# Patient Record
Sex: FEMALE | NOT HISPANIC OR LATINO | URBAN - METROPOLITAN AREA
[De-identification: names, ages, dates, MRNs, and addresses within clinical notes are randomized per-mention and may not be internally consistent; named-entity substitution may affect disease eponyms.]

---

## 2018-05-08 ENCOUNTER — TRANSCRIBE ORDERS (OUTPATIENT)
Dept: PHYSICAL THERAPY | Facility: HOSPITAL | Age: 58
End: 2018-05-08

## 2018-05-08 DIAGNOSIS — I89.0 LYMPHEDEMA: Primary | ICD-10-CM

## 2018-05-09 ENCOUNTER — HOSPITAL ENCOUNTER (OUTPATIENT)
Dept: PHYSICAL THERAPY | Facility: HOSPITAL | Age: 58
Setting detail: THERAPIES SERIES
Discharge: HOME | End: 2018-05-09

## 2018-05-09 DIAGNOSIS — I89.0 LYMPHEDEMA: Primary | ICD-10-CM

## 2018-05-09 PROCEDURE — 97162 PT EVAL MOD COMPLEX 30 MIN: CPT

## 2018-05-09 NOTE — THERAPY EVALUATION
Physical Therapy Lymphedema Initial Evaluation   [facility]     Patient Name: Yuliet Ha  : 1960  MRN: [id number]  Today's Date: 2018      Visit Date: 2018    Visit Dx:    ICD-10-CM ICD-9-CM   1. Lymphedema I89.0 457.1       There is no problem list on file for this patient.       Past Medical History:   Diagnosis Date   • Arthritis         No past surgical history on file.    Visit Dx:    ICD-10-CM ICD-9-CM   1. Lymphedema I89.0 457.1             Patient History     Row Name 18 1000             History    Chief Complaint Swelling;Difficulty Walking  -PC      Date Current Problem(s) Began --   3 years ago  -PC      Brief Description of Current Complaint Pt states she began having swelling in her legs 3 years ago but did not have insurance until now. States she has not had trauma or surgeries on her legs, has never had chemo or radiation. States this swelling has limited her mobility and she has gained weight as well.  -PC      Patient/Caregiver Goals Decrease swelling;Know what to do to help the symptoms;Improve mobility;Relieve pain  -PC      Are you or can you be pregnant No  -PC         Pain     Pain Location Back;Hand;Knee  -PC      Pain at Present 8  -PC      Pain at Best 8  -PC      Pain at Worst 8  -PC      Difficulties with ADL's? Walking limited  -PC      Difficulties with recreational activities? Walking limited  -PC         Fall Risk Assessment    Any falls in the past year: No  -PC         Services    Are you currently receiving Home Health services No  -PC         Daily Activities    Primary Language English  -PC      Are you able to read Yes  -PC      Are you able to write Yes  -PC      How does patient learn best? Reading  -PC      Teaching needs identified Management of Condition;Home Exercise Program  -PC      Patient is concerned about/has problems with Climbing Stairs;Performing home management (household chores, shopping, care of dependents);Walking  -PC       Does patient have problems with the following? None  -PC      Barriers to learning None  -PC      Functional Status mobility issues preventing performance of daily activities  -PC      Explanation of Functional Status Problem Pt has difficulty walking.  -PC      Pt Participated in POC and Goals Yes  -PC         Safety    Are you being hurt, hit, or frightened by anyone at home or in your life? No  -PC      Are you being neglected by a caregiver No  -PC        User Key  (r) = Recorded By, (t) = Taken By, (c) = Cosigned By    Initials Name Provider Type    PC Hannah Fuentes, PT Physical Therapist                Lymphedema     Row Name 05/09/18 1000             Subjective Pain    Able to rate subjective pain? yes  -PC      Pre-Treatment Pain Level 8  -PC      Post-Treatment Pain Level 8  -PC         Subjective Comments    Subjective Comments States she hasn't had insurance for 3 years so was unable to seek treatment until now.  -PC         Lymphedema Assessment    Lymphedema Classification RLE:;LLE:;stage 3 (Lymphostatic Elephantiasis)  -PC         Lymphedema Edema Assessment    Ptting Edema Category --   No pitting  -PC      Edema Assessment Comment Pt has severe edema from calf areas to mid thighs on both legs with lobules post calf and above post knees. Lobules mostly soft with min fibrosis.  Mod edema both ankles and min edema both feet.  -PC         Skin Changes/Observations    Skin Observations Comment Skin is dry but intact.  No open sores or drainage.  -PC         Lymphedema Sensation    Lymphedema Sensation Tests light touch  -PC      Lymphedema Light Touch WNL  -PC         Lymphedema Measurements    Measurement Type(s) Circumferential  -PC      Circumferential Areas Lower extremities  -PC         LLE Circumferential (cm)    Measurement Location 1 10cm above knee  -PC      Left 1 101 cm  -PC      Measurement Location 2 Knee  -PC      Left 2 78 cm  -PC      Measurement Location 3 10cm below knee  -PC      Left  3 75 cm  -PC      Measurement Location 4 20cm below knee  -PC      Left 4 48 cm  -PC      Measurement Location 5 Ankle  -PC      Left 5 24.7 cm  -PC      Measurement Location 6 Midfoot  -PC      Left 6 26.5 cm  -PC      Measurement Location 7 Total  -PC      Left 7 353.2 cm  -PC         RLE Circumferential (cm)    Measurement Location 1 10cm above knee  -PC      Right 1 96 cm  -PC      Measurement Location 2 Knee  -PC      Right 2 68 cm  -PC      Measurement Location 3 10cm below knee  -PC      Right 3 62 cm  -PC      Measurement Location 4 20cm below knee  -PC      Right 4 44 cm  -PC      Measurement Location 5 Ankle  -PC      Right 5 24 cm  -PC      Measurement Location 6 Midfoot  -PC      Right 6 24 cm  -PC      Measurement Location 7 Total  -PC      Right 7 318 cm  -PC        User Key  (r) = Recorded By, (t) = Taken By, (c) = Cosigned By    Initials Name Provider Type    PC Hannah Fuentes PT Physical Therapist                  PT Ortho     Row Name 05/09/18 1000       Posture/Observations    Posture/Observations Comments Pt ambulates very slowly and with much effort.  Has difficulty lifting legs to move forward.  Sidebends trunk to assist with lifting leg off ground.  -PC       General ROM    GENERAL ROM COMMENTS Both knees limited due to lobules.  -PC      User Key  (r) = Recorded By, (t) = Taken By, (c) = Cosigned By    Initials Name Provider Type    PC Hannah Fuentes PT Physical Therapist                    Therapy Education  Education Details: Reviewed treatment program and plan of care.  Given: Other (comment)  Program: New  How Provided: Verbal, Written (Issued written info on Lymphedema.)  Provided to: Patient  Level of Understanding: Verbalized            Exercises     Row Name 05/09/18 1000             Subjective Comments    Subjective Comments States she hasn't had insurance for 3 years so was unable to seek treatment until now.  -PC         Subjective Pain    Able to rate subjective pain? yes  -PC       Pre-Treatment Pain Level 8  -PC      Post-Treatment Pain Level 8  -PC        User Key  (r) = Recorded By, (t) = Taken By, (c) = Cosigned By    Initials Name Provider Type    PC Hannah Fuentes PT Physical Therapist                              PT OP Goals     Row Name 05/09/18 1100          PT Short Term Goals    STG Date to Achieve 05/23/18  -PC     STG 1 Pt demo awareness of condition and precautions for improved prevention, management, care of symptoms, and ease of transition to self-care of condition.   -PC     STG 1 Progress New  -PC     STG 2 Pt/family independent with self-wrapping techniques of compression bandages as indicated for improved self-management of condition.  -PC     STG 2 Progress New  -PC     STG 3 Pt demo decreased net edema of >/=10-15cm for decreased edema symptoms, decreased risk of infection, and improved skin care.  -PC     STG 3 Progress New  -PC        Long Term Goals    LTG Date to Achieve 06/09/18  -PC     LTG 1 Pt/family independent with self-care techniques for self-management of condition.  -PC     LTG 1 Progress New  -PC     LTG 2 Pt demo decreased net edema of >/=15-30cm for decreased edema symptoms, decreased risk of infection, and improved skin care.  -PC     LTG 2 Progress New  -PC     LTG 3 Pt/family independent with compression garments as indicated for self-management of condition.  -PC     LTG 3 Progress New  -PC        Time Calculation    PT Goal Re-Cert Due Date 08/09/18  -PC       User Key  (r) = Recorded By, (t) = Taken By, (c) = Cosigned By    Initials Name Provider Type    PC Hannah Fuentes PT Physical Therapist                PT Assessment/Plan     Row Name 05/09/18 1124          PT Assessment    Functional Limitations Performance in leisure activities;Limitations in community activities;Performance in self-care ADL;Impaired gait  -PC     Impairments Edema;Gait;Endurance;Impaired lymphatic circulation;Range of motion;Pain  -PC     Assessment Comments Pt is a 57   yr old female who has had severe swelling in her legs for approx 3 years.  She did not have insurance so was unable to seek treatment until now.  She reports no surgeries or trauma to her legs, and no history of chemo or radiation.  Pt reports pain at level 8/10 in her back and knees and difficulty walking.  She presents with severe edema in both legs from calf areas to mid thighs.  She has mod edema in both ankles and min edema in both feet.  Edema is soft without pitting.  She has lobules on both legs post calf and post lower thigh. Lobules are mostly soft with some fibrosis.  Pt has limited knee ROM due to the lobules.  Skin is dry, but no open sores or weeping. PT ambulates with much difficulty, very slowly with much lateral trunk movement.  She has difficulty with sit to stand and getting in/out of car as well. Pt's  can help with bandaging on weekends. Feel she would benefit from a compression pump for home use as well to help with the lobules.  -PC     Please refer to paper survey for additional self-reported information Yes  -PC     Rehab Potential Good  -PC     Patient/caregiver participated in establishment of treatment plan and goals Yes  -PC     Patient would benefit from skilled therapy intervention Yes  -PC        PT Plan    PT Frequency 5x/week  -PC     Predicted Duration of Therapy Intervention (OT Eval) 4 weeks  -PC     Planned CPT's? PT EVAL MOD COMPLELITY: 30607;PT MANUAL THERAPY EA 15 MIN: 48020;PT THER ACT EA 15 MIN: 65642;PT SELF CARE/HOME MGMT/TRAIN EA 15: 90595  -PC     Physical Therapy Interventions (Optional Details) bandaging;home exercise program;manual lymphatic drainage;patient/family education  -PC     PT Plan Comments See POC.  -PC       User Key  (r) = Recorded By, (t) = Taken By, (c) = Cosigned By    Initials Name Provider Type    PC Hannah Fuentes, PT Physical Therapist                       Time Calculation:   Start Time: 0945  Stop Time: 1020  Time Calculation (min): 35  min     Therapy Charges for Today     Code Description Service Date Service Provider Modifiers Qty    27405175272 HC PT EVAL MOD COMPLEXITY 2 5/9/2018 Hannha Fuentes, PT GP 1                    Hannah Fuentes, PT  5/9/2018

## 2018-05-14 ENCOUNTER — HOSPITAL ENCOUNTER (OUTPATIENT)
Dept: PHYSICAL THERAPY | Facility: HOSPITAL | Age: 58
Setting detail: THERAPIES SERIES
Discharge: HOME | End: 2018-05-14

## 2018-05-14 DIAGNOSIS — I89.0 LYMPHEDEMA: Primary | ICD-10-CM

## 2018-05-14 PROCEDURE — 97140 MANUAL THERAPY 1/> REGIONS: CPT

## 2018-05-14 NOTE — THERAPY TREATMENT NOTE
Outpatient Physical Therapy Lymphedema Treatment Note   [facility]     Patient Name: Yuliet Ha  : 1960  MRN: [id number]  Today's Date: 2018        Visit Date: 2018    Visit Dx:    ICD-10-CM ICD-9-CM   1. Lymphedema I89.0 457.1       There is no problem list on file for this patient.             Lymphedema     Row Name 18 1000             Subjective Pain    Able to rate subjective pain? yes  -PC      Pre-Treatment Pain Level 8  -PC      Post-Treatment Pain Level 8  -PC         Subjective Comments    Subjective Comments States the edema makes her legs so heavy it is difficult to walk or exercise.  -PC         Manual Lymphatic Drainage    Manual Lymphatic Drainage Comments B inguinal, BLE's/  -PC         Compression/Skin Care    Skin Care moisturizing lotion applied   Eucerin lotion  -PC      Wrapping Location lower extremity  -PC      Wrapping Location LE bilateral:;foot to knee  -PC      Bandaging Comments Right: Tg9, artiflex foot and ankle, Ros soft ankle to knee, Short stretch bandages 1-8cm, 1-10cm, 2-12cm.  Left: TgK1, artiflex foot and ankle, thin gray foam around calf secured with transelast, Short stretch bandages 1-8cm, 1-10cm, 2-12cm.  Cast shoes.  -PC      Compression/Skin Care Comments Right leg very difficult to bandage due to size of calf being very large and size of ankle much smaller.  -PC        User Key  (r) = Recorded By, (t) = Taken By, (c) = Cosigned By    Initials Name Provider Type    PC Hannah Fuentes, PT Physical Therapist                              PT Assessment/Plan     Row Name 18 1032          PT Assessment    Assessment Comments PT returns to start treatment today.  Left leg was very difficult to wrap due to difference in size from calf to ankle.    -PC        PT Plan    PT Plan Comments Cont per POC.  -PC       User Key  (r) = Recorded By, (t) = Taken By, (c) = Cosigned By    Initials Name Provider Type    PC Hannah Fuentes, SERVANDO Physical  Therapist                     Exercises     Row Name 05/14/18 1000             Subjective Comments    Subjective Comments States the edema makes her legs so heavy it is difficult to walk or exercise.  -PC         Subjective Pain    Able to rate subjective pain? yes  -PC      Pre-Treatment Pain Level 8  -PC      Post-Treatment Pain Level 8  -PC        User Key  (r) = Recorded By, (t) = Taken By, (c) = Cosigned By    Initials Name Provider Type    PC Hannah Fuentes, PT Physical Therapist                            Therapy Education  Education Details: Began bandaging teaching.  Reviewed bandaging precautions.  Given: Bandaging/dressing change  Program: New  How Provided: Verbal, Demonstration  Provided to: Patient  Level of Understanding: Verbalized              Time Calculation:   Start Time: 0840 (Pt was 10 min late)  Stop Time: 1000  Time Calculation (min): 80 min  Total Timed Code Minutes- PT: 80 minute(s)     Therapy Charges for Today     Code Description Service Date Service Provider Modifiers Qty    51619877203 HC PT MANUAL THERAPY EA 15 MIN 5/14/2018 Hannah Fuentes, PT GP 5                    Hannah Fuentes, PT  5/14/2018

## 2018-05-15 ENCOUNTER — HOSPITAL ENCOUNTER (OUTPATIENT)
Dept: PHYSICAL THERAPY | Facility: HOSPITAL | Age: 58
Setting detail: THERAPIES SERIES
Discharge: HOME | End: 2018-05-15

## 2018-05-15 DIAGNOSIS — I89.0 LYMPHEDEMA: Primary | ICD-10-CM

## 2018-05-15 PROCEDURE — 97140 MANUAL THERAPY 1/> REGIONS: CPT

## 2018-05-15 NOTE — THERAPY TREATMENT NOTE
Outpatient Physical Therapy Lymphedema Treatment Note   [facility]     Patient Name: Yuliet Ha  : 1960  MRN: [id number]  Today's Date: 5/15/2018        Visit Date: 05/15/2018    Visit Dx:    ICD-10-CM ICD-9-CM   1. Lymphedema I89.0 457.1       There is no problem list on file for this patient.             Lymphedema     Row Name 05/15/18 1100             Subjective Pain    Able to rate subjective pain? yes  -KD      Pre-Treatment Pain Level 8  -KD      Post-Treatment Pain Level 8  -KD         Subjective Comments    Subjective Comments States the left bandage stayed up until about 1pm yesterday, the right until about 9pm.  -KD         Manual Lymphatic Drainage    Manual Lymphatic Drainage Comments B inguinal, B LE's  -KD         Compression/Skin Care    Skin Care moisturizing lotion applied   Eucerin lotion  -KD      Wrapping Location lower extremity  -KD      Wrapping Location LE bilateral:;foot to knee  -KD      Bandaging Comments Right: Tg9, artiflex foot and ankle, Ros soft ankle to knee, Short stretch bandages 1-8cm, 1-10cm, 2-12cm. Covered with Tubigrip size G. Left: Tubigrip size J mid calf to knee/tg 9 foot and lower calf, 1/2 roll of artiflex foot and ankle/ full roll distal lower leg, thin gray foam around calf secured with transelast, Short stretch bandages 1-8cm, 1-10cm, 2-12cm. Covered with Tubigrip size J. Cast shoes.  -KD        User Key  (r) = Recorded By, (t) = Taken By, (c) = Cosigned By    Initials Name Provider Type    CHARLEY Acuna PT Physical Therapist                              PT Assessment/Plan     Row Name 05/15/18 6045          PT Assessment    Assessment Comments Made some changes to bandaging today to hopefully help keep bandages from sliding down too soon-will monitor response/tolerance.  -KD        PT Plan    PT Plan Comments Cont.  -KD       User Key  (r) = Recorded By, (t) = Taken By, (c) = Cosigned By    Initials Name Provider Type    CHARLEY Acuna PT  Physical Therapist                     Exercises     Row Name 05/15/18 1100             Subjective Comments    Subjective Comments States the left bandage stayed up until about 1pm yesterday, the right until about 9pm.  -KD         Subjective Pain    Able to rate subjective pain? yes  -KD      Pre-Treatment Pain Level 8  -KD      Post-Treatment Pain Level 8  -KD        User Key  (r) = Recorded By, (t) = Taken By, (c) = Cosigned By    Initials Name Provider Type    CHARLEY Acuna PT Physical Therapist                            Therapy Education  Education Details: Bandaging discussion.  Given: Bandaging/dressing change  Program: Reinforced  How Provided: Verbal, Demonstration  Provided to: Patient  Level of Understanding: Verbalized              Time Calculation:   Start Time: 0945  Stop Time: 1104  Time Calculation (min): 79 min     Therapy Charges for Today     Code Description Service Date Service Provider Modifiers Qty    36479634690 HC PT MANUAL THERAPY EA 15 MIN 5/15/2018 Cherelle Acuna, PT GP 5                    Cherelle Acuna PT  5/15/2018

## 2018-05-16 ENCOUNTER — HOSPITAL ENCOUNTER (OUTPATIENT)
Dept: PHYSICAL THERAPY | Facility: HOSPITAL | Age: 58
Setting detail: THERAPIES SERIES
Discharge: HOME | End: 2018-05-16

## 2018-05-16 DIAGNOSIS — I89.0 LYMPHEDEMA: Primary | ICD-10-CM

## 2018-05-16 PROCEDURE — 97140 MANUAL THERAPY 1/> REGIONS: CPT

## 2018-05-16 NOTE — THERAPY TREATMENT NOTE
Outpatient Physical Therapy Lymphedema Treatment Note   [facility]     Patient Name: Yuliet Ha  : 1960  MRN: [id number]  Today's Date: 2018        Visit Date: 2018    Visit Dx:    ICD-10-CM ICD-9-CM   1. Lymphedema I89.0 457.1       There is no problem list on file for this patient.             Lymphedema     Row Name 18 1100             Subjective Pain    Able to rate subjective pain? yes  -PC      Pre-Treatment Pain Level 7  -PC      Post-Treatment Pain Level 7  -PC         Subjective Comments    Subjective Comments States the bandages stayed up a little better.  Right ones until about 6pm, and she still has the left ones on.  Thinks her left calf is less swollen.  -PC         Lymphedema Edema Assessment    Edema Assessment Comment Noted increased wrinkles and tissue is softer in left calf.   -PC         Skin Changes/Observations    Skin Observations Comment Intact, no redness or sores.  -PC         Manual Lymphatic Drainage    Manual Lymphatic Drainage Comments B inguinal, B LE's  -PC         Compression/Skin Care    Compression/Skin Care remove bandages   on right  -PC      Skin Care washed/dried;lotion applied   Eucerin  -PC      Wrapping Location lower extremity  -PC      Wrapping Location LE bilateral:;foot to knee  -PC      Bandaging Comments Right: Tg9, artiflex foot and ankle, Rosidal soft from knee down to ankle, short stretch bandages 1-6cm foot and ankle, 1-10cm from knee toward ankle, 2-12cm ankle to knee. Covered with Tubigrip. Left: Tg9, artiflex foot, ankle, and lower leg, Rosidal soft from knee to ankle, gray foam around lower half of lower leg, tubigrip, 1-6cm foot and ankle, 1-10cm from knee down toward ankle, 1-12cm ankle to knee, and 1-10cm calf to knee. Tubigrip over all. Cast shoes.  -PC        User Key  (r) = Recorded By, (t) = Taken By, (c) = Cosigned By    Initials Name Provider Type    PC Hannah Fuentes, PT Physical Therapist                               PT Assessment/Plan     Row Name 05/16/18 1147          PT Assessment    Assessment Comments Bandages stayed up a little better.  Again made some changes to try to keep them from sliding. Did note loose tissue and increased wrinkles in left calf, indicating decreased edema.  -PC       User Key  (r) = Recorded By, (t) = Taken By, (c) = Cosigned By    Initials Name Provider Type    PC Hannah Fuentes, PT Physical Therapist                     Exercises     Row Name 05/16/18 1100             Subjective Comments    Subjective Comments States the bandages stayed up a little better.  Right ones until about 6pm, and she still has the left ones on.  Thinks her left calf is less swollen.  -PC         Subjective Pain    Able to rate subjective pain? yes  -PC      Pre-Treatment Pain Level 7  -PC      Post-Treatment Pain Level 7  -PC        User Key  (r) = Recorded By, (t) = Taken By, (c) = Cosigned By    Initials Name Provider Type    PC Hannah Fuentes, PT Physical Therapist                            Therapy Education  Education Details: Instructed pt in ankle pumps and encouraged walking.  Given: HEP  Program: New  How Provided: Verbal, Demonstration  Provided to: Patient  Level of Understanding: Verbalized, Demonstrated              Time Calculation:   Start Time: 0835  Stop Time: 0955  Time Calculation (min): 80 min  Total Timed Code Minutes- PT: 80 minute(s)     Therapy Charges for Today     Code Description Service Date Service Provider Modifiers Qty    08321979518 HC PT MANUAL THERAPY EA 15 MIN 5/16/2018 Hannah Fuentes, PT GP 5                    Hannah Fuentes, PT  5/16/2018

## 2018-05-17 ENCOUNTER — HOSPITAL ENCOUNTER (OUTPATIENT)
Dept: PHYSICAL THERAPY | Facility: HOSPITAL | Age: 58
Setting detail: THERAPIES SERIES
Discharge: HOME | End: 2018-05-17

## 2018-05-17 DIAGNOSIS — I89.0 LYMPHEDEMA: Primary | ICD-10-CM

## 2018-05-17 PROCEDURE — 97140 MANUAL THERAPY 1/> REGIONS: CPT

## 2018-05-17 NOTE — THERAPY TREATMENT NOTE
Outpatient Physical Therapy Lymphedema Treatment Note   [facility]     Patient Name: Yuliet Ha  : 1960  MRN: [id number]  Today's Date: 2018        Visit Date: 2018    Visit Dx:    ICD-10-CM ICD-9-CM   1. Lymphedema I89.0 457.1       There is no problem list on file for this patient.             Lymphedema     Row Name 18 1100             Subjective Pain    Able to rate subjective pain? yes  -KD      Pre-Treatment Pain Level 7  -KD      Post-Treatment Pain Level 7  -KD         Subjective Comments    Subjective Comments States the right leg bandages slid down about 1pm yesterday, but the left ones have stayed up well. Reports some discomfort in left leg for a while yesterday, but it improved. States she's now able to feel her heel touch the table when she has her leg stretched out because the swelling is less in the upper calf.  -KD         Lymphedema Edema Assessment    Edema Assessment Comment Noted definite wrinkling left leg lobule indicating decrease in edema.  -KD         Manual Lymphatic Drainage    Manual Lymphatic Drainage Comments B inguinal, B LE's  -KD         Compression/Skin Care    Compression/Skin Care remove bandages  -KD      Skin Care washed/dried;lotion applied   Eucerin  -KD      Wrapping Location lower extremity  -KD      Wrapping Location LE bilateral:;foot to knee  -KD      Bandaging Comments Both legs: Tg9, artiflex foot, ankle, and lower leg, Rosidal soft from knee to ankle, gray foam around lower half of lower leg, tubigrip, 1-6cm foot and ankle, 1-10cm from knee down toward ankle, 1-12cm ankle to knee, and 1-10cm calf to knee. Tubigrip over all. Cast shoes.  -KD        User Key  (r) = Recorded By, (t) = Taken By, (c) = Cosigned By    Initials Name Provider Type    CHARLEY Acuna, PT Physical Therapist                              PT Assessment/Plan     Row Name 18 1106 18 1147       PT Assessment    Assessment Comments Bandages stayed up  well on left leg, but had slid down several inches on the right. Bandaged right leg today the same as the left to encourage bandages to stay up better. Swelling does appear to be decreasing in the left calf.  -KD Bandages stayed up a little better.  Again made some changes to try to keep them from sliding. Did note loose tissue and increased wrinkles in left calf, indicating decreased edema.  -PC       PT Plan    PT Plan Comments Cont.  -KD  --      User Key  (r) = Recorded By, (t) = Taken By, (c) = Cosigned By    Initials Name Provider Type    LISA Fuentes, PT Physical Therapist    CHARLEY Acuna PT Physical Therapist                     Exercises     Row Name 05/17/18 1100             Subjective Comments    Subjective Comments States the right leg bandages slid down about 1pm yesterday, but the left ones have stayed up well. Reports some discomfort in left leg for a while yesterday, but it improved. States she's now able to feel her heel touch the table when she has her leg stretched out because the swelling is less in the upper calf.  -KD         Subjective Pain    Able to rate subjective pain? yes  -KD      Pre-Treatment Pain Level 7  -KD      Post-Treatment Pain Level 7  -KD        User Key  (r) = Recorded By, (t) = Taken By, (c) = Cosigned By    Initials Name Provider Type    CHARLEY Acuna PT Physical Therapist                            Therapy Education  Education Details: Bandaging  Given: Bandaging/dressing change  Program: Reinforced  How Provided: Verbal, Demonstration  Provided to: Patient  Level of Understanding: Verbalized              Time Calculation:   Start Time: 0834  Stop Time: 0953  Time Calculation (min): 79 min     Therapy Charges for Today     Code Description Service Date Service Provider Modifiers Qty    39521695296  PT MANUAL THERAPY EA 15 MIN 5/17/2018 Cherelle Acuna, PT GP 5                    Cherelle Acuna PT  5/17/2018

## 2018-05-18 ENCOUNTER — HOSPITAL ENCOUNTER (OUTPATIENT)
Dept: PHYSICAL THERAPY | Facility: HOSPITAL | Age: 58
Setting detail: THERAPIES SERIES
Discharge: HOME | End: 2018-05-18

## 2018-05-18 DIAGNOSIS — I89.0 LYMPHEDEMA: Primary | ICD-10-CM

## 2018-05-18 PROCEDURE — 97140 MANUAL THERAPY 1/> REGIONS: CPT

## 2018-05-18 NOTE — THERAPY TREATMENT NOTE
Outpatient Physical Therapy Lymphedema Treatment Note   [facility]     Patient Name: Yuliet Ha  : 1960  MRN: [id number]  Today's Date: 2018        Visit Date: 2018    Visit Dx:    ICD-10-CM ICD-9-CM   1. Lymphedema I89.0 457.1       There is no problem list on file for this patient.             Lymphedema     Row Name 18 1000 18 1100          Subjective Pain    Able to rate subjective pain? yes  -PC yes  -KD     Pre-Treatment Pain Level 7  -PC 7  -KD     Post-Treatment Pain Level 7  -PC 7  -KD     Subjective Pain Comment Pain is 0 at rest, 7 while walking.  -PC  --        Subjective Comments    Subjective Comments States the right ones started slipping at 1:00 yesterday, the right ones started slipping this am. States she can tell this is helping.  -PC States the right leg bandages slid down about 1pm yesterday, but the left ones have stayed up well. Reports some discomfort in left leg for a while yesterday, but it improved. States she's now able to feel her heel touch the table when she has her leg stretched out because the swelling is less in the upper calf.  -KD        Lymphedema Edema Assessment    Edema Assessment Comment  -- Noted definite wrinkling left leg lobule indicating decrease in edema.  -KD        Skin Changes/Observations    Skin Observations Comment Skin on lower half of left lower leg slightly red.  Both legs slightly itchy today per pt.  -PC  --        Lymphedema Measurements    Measurement Type(s) Circumferential  -PC  --     Circumferential Areas Lower extremities  -PC  --        LLE Circumferential (cm)    Measurement Location 1 10cm above knee  -PC  --     Left 1 98 cm  -PC  --     Measurement Location 2 Knee  -PC  --     Left 2 60 cm  -PC  --     Measurement Location 3 10cm below knee  -PC  --     Left 3 71 cm  -PC  --     Measurement Location 4 20cm below knee  -PC  --     Left 4 46 cm  -PC  --     Measurement Location 5 Ankle  -PC  --     Left 5  23.6 cm  -PC  --     Measurement Location 6 Midfoot  -PC  --     Left 6 25.7 cm  -PC  --     Measurement Location 7 Total  -PC  --     Left 7 324.3 cm  -PC  --     Measurement Location 8 Total decrease   -PC  --     Left 8 -28.9 cm  -PC  --        RLE Circumferential (cm)    Measurement Location 1 10cm above knee  -PC  --     Right 1 91 cm  -PC  --     Measurement Location 2 Knee  -PC  --     Right 2 57 cm  -PC  --     Measurement Location 3 10cm below knee  -PC  --     Right 3 59 cm  -PC  --     Measurement Location 4 20cm below knee  -PC  --     Right 4 42 cm  -PC  --     Measurement Location 5 Ankle  -PC  --     Right 5 22.8 cm  -PC  --     Measurement Location 6 Midfoot  -PC  --     Right 6 24 cm  -PC  --     Measurement Location 7 Total  -PC  --     Right 7 295.8 cm  -PC  --     Measurement Location 8 Total decrease  -PC  --     Right 8 -22.2 cm  -PC  --        Manual Lymphatic Drainage    Manual Lymphatic Drainage Comments B inguinal, B LE's  -PC B inguinal, B LE's  -KD        Compression/Skin Care    Compression/Skin Care remove bandages  -PC remove bandages  -KD     Skin Care washed/dried;lotion applied   Eucerin  -PC washed/dried;lotion applied   Eucerin  -KD     Wrapping Location lower extremity  -PC lower extremity  -KD     Wrapping Location LE bilateral:;foot to knee  -PC bilateral:;foot to knee  -KD     Bandaging Comments Both legs: Tg9, artiflex foot, ankle, and lower leg, Rosidal soft from knee to ankle, gray foam around lower half of lower leg, tubigrip, 1-6cm foot and ankle, 1-10cm from knee down toward ankle, 1-12cm ankle to knee, and 1-10cm calf to knee. Tubigrip over all. Cast shoes.  -PC Both legs: Tg9, artiflex foot, ankle, and lower leg, Rosidal soft from knee to ankle, gray foam around lower half of lower leg, tubigrip, 1-6cm foot and ankle, 1-10cm from knee down toward ankle, 1-12cm ankle to knee, and 1-10cm calf to knee. Tubigrip over all. Cast shoes.  -KD       User Key  (r) = Recorded By,  (t) = Taken By, (c) = Cosigned By    Initials Name Provider Type    PC Hannah Fuentes, PT Physical Therapist    KD Cherelle Acuna, PT Physical Therapist                              PT Assessment/Plan     Row Name 05/18/18 1028 05/17/18 1106       PT Assessment    Assessment Comments Bandages slid down approx 5-6 inches. Bandaged a little more tightly today.  Msmts have already decreased by 22.2cm on right and by 28.9cm on left.  present to learn bandaging, but this bandaging will be very difficult for them to do at home.  He is willing to try, however.  -PC Bandages stayed up well on left leg, but had slid down several inches on the right. Bandaged right leg today the same as the left to encourage bandages to stay up better. Swelling does appear to be decreasing in the left calf.  -KD       PT Plan    PT Plan Comments Cont.  -PC Cont.  -KD      User Key  (r) = Recorded By, (t) = Taken By, (c) = Cosigned By    Initials Name Provider Type    PC Hannah Fuentes, PT Physical Therapist    CHARLEY Acuna, PT Physical Therapist                     Exercises     Row Name 05/18/18 1000 05/17/18 1100          Subjective Comments    Subjective Comments States the right ones started slipping at 1:00 yesterday, the right ones started slipping this am. States she can tell this is helping.  -PC States the right leg bandages slid down about 1pm yesterday, but the left ones have stayed up well. Reports some discomfort in left leg for a while yesterday, but it improved. States she's now able to feel her heel touch the table when she has her leg stretched out because the swelling is less in the upper calf.  -KD        Subjective Pain    Able to rate subjective pain? yes  -PC yes  -KD     Pre-Treatment Pain Level 7  -PC 7  -KD     Post-Treatment Pain Level 7  -PC 7  -KD     Subjective Pain Comment Pain is 0 at rest, 7 while walking.  -PC  --       User Key  (r) = Recorded By, (t) = Taken By, (c) = Cosigned By    Initials Name  Provider Type    PC Hannah Fuentes, PT Physical Therapist    CHARLEY Acuna, PT Physical Therapist                            Therapy Education  Education Details:  present to learn bandaging.  Given: Bandaging/dressing change  Program: Reinforced  How Provided: Verbal, Demonstration, Written (Issued written instructions)  Provided to: Patient, Caregiver  Level of Understanding: Verbalized              Time Calculation:   Start Time: 0835  Stop Time: 0955  Time Calculation (min): 80 min  Total Timed Code Minutes- PT: 80 minute(s)     Therapy Charges for Today     Code Description Service Date Service Provider Modifiers Qty    64789751338 HC PT MANUAL THERAPY EA 15 MIN 5/18/2018 Hannah Fuentes, PT GP 5                    Hannah Fuentes, PT  5/18/2018

## 2018-05-21 ENCOUNTER — HOSPITAL ENCOUNTER (OUTPATIENT)
Dept: PHYSICAL THERAPY | Facility: HOSPITAL | Age: 58
Setting detail: THERAPIES SERIES
Discharge: HOME | End: 2018-05-21

## 2018-05-21 DIAGNOSIS — I89.0 LYMPHEDEMA: Primary | ICD-10-CM

## 2018-05-21 PROCEDURE — 97140 MANUAL THERAPY 1/> REGIONS: CPT

## 2018-05-21 NOTE — THERAPY TREATMENT NOTE
Outpatient Physical Therapy Lymphedema Treatment Note   [facility]     Patient Name: Yuliet Ha  : 1960  MRN: [id number]  Today's Date: 2018        Visit Date: 2018    Visit Dx:    ICD-10-CM ICD-9-CM   1. Lymphedema I89.0 457.1       There is no problem list on file for this patient.             Lymphedema     Row Name 18 1000             Subjective Pain    Able to rate subjective pain? yes  -PC      Pre-Treatment Pain Level 8  -PC      Post-Treatment Pain Level 8  -PC         Subjective Comments    Subjective Comments States she took it easy and the bandages stayed up perfectly on left and slid just a little on right. States she fell yesterday going up the steps at [employer].  Her left side is a little sore today.   -PC         Lymphedema Edema Assessment    Edema Assessment Comment  had wrapped legs yesterday with fair technique.    -PC         Manual Lymphatic Drainage    Manual Lymphatic Drainage Comments B inguinal, B LE's  -PC         Compression/Skin Care    Compression/Skin Care remove bandages  -PC      Skin Care washed/dried;lotion applied   Eucerin  -PC      Wrapping Location lower extremity  -PC      Wrapping Location LE bilateral:;foot to knee  -PC      Bandaging Comments Both legs: Tg9, artiflex foot, ankle, and lower leg, Rosidal soft from knee to ankle, gray foam around lower half of lower leg, tubigrip, 1-8cm foot and ankle, 1-10cm from knee down toward ankle, 2-12cm ankle to knee. Tubigrip over all. Cast shoes.  -PC        User Key  (r) = Recorded By, (t) = Taken By, (c) = Cosigned By    Initials Name Provider Type    PC Hannah Fuentes, PT Physical Therapist                              PT Assessment/Plan     Row Name 18 1044          PT Assessment    Assessment Comments Pt's  was able to do a fair job with the bandaging. More practice will help.  -PC        PT Plan    PT Plan Comments Cont.  -PC       User Key  (r) = Recorded By, (t) = Taken  By, (c) = Cosigned By    Initials Name Provider Type    PC Hannah Fuentes, PT Physical Therapist                     Exercises     Row Name 05/21/18 1000             Subjective Comments    Subjective Comments States she took it easy and the bandages stayed up perfectly on left and slid just a little on right. States she fell yesterday going up the steps at [employer].  Her left side is a little sore today.   -PC         Subjective Pain    Able to rate subjective pain? yes  -PC      Pre-Treatment Pain Level 8  -PC      Post-Treatment Pain Level 8  -PC        User Key  (r) = Recorded By, (t) = Taken By, (c) = Cosigned By    Initials Name Provider Type    PC Hannah Fuentes, PT Physical Therapist                            Therapy Education  Education Details: Reviewed bandaging again with pt. Discussed walking and ankle pumps as well as other LE ex.  Given: Bandaging/dressing change, HEP  Program: Reinforced  How Provided: Verbal, Demonstration  Provided to: Patient  Level of Understanding: Verbalized              Time Calculation:   Start Time: 0835  Stop Time: 0957  Time Calculation (min): 82 min  Total Timed Code Minutes- PT: 82 minute(s)     Therapy Charges for Today     Code Description Service Date Service Provider Modifiers Qty    [id number]  PT MANUAL THERAPY EA 15 MIN 5/21/2018 Hannah Fuentes, PT GP 5                    Hannah Fuentes PT  5/21/2018

## 2018-05-22 ENCOUNTER — HOSPITAL ENCOUNTER (OUTPATIENT)
Dept: PHYSICAL THERAPY | Facility: HOSPITAL | Age: 58
Setting detail: THERAPIES SERIES
Discharge: HOME | End: 2018-05-22

## 2018-05-22 DIAGNOSIS — I89.0 LYMPHEDEMA: Primary | ICD-10-CM

## 2018-05-22 PROCEDURE — 97140 MANUAL THERAPY 1/> REGIONS: CPT

## 2018-05-22 NOTE — THERAPY TREATMENT NOTE
Outpatient Physical Therapy Lymphedema Treatment Note   [facility]     Patient Name: Yuliet Ha  : 1960  MRN: [id number]  Today's Date: 2018        Visit Date: 2018    Visit Dx:    ICD-10-CM ICD-9-CM   1. Lymphedema I89.0 457.1       There is no problem list on file for this patient.             Lymphedema     Row Name 18 1000             Subjective Pain    Able to rate subjective pain? yes  -KD      Pre-Treatment Pain Level 6  -KD      Post-Treatment Pain Level 6  -KD      Subjective Pain Comment Pain is 0 at rest, 6 when walking  -KD         Subjective Comments    Subjective Comments States she is able to move better and stand for longer periods of time since doing therapy.  -KD         Manual Lymphatic Drainage    Manual Lymphatic Drainage Comments B inguinal, B LE's  -KD         Compression/Skin Care    Compression/Skin Care remove bandages  -KD      Skin Care washed/dried;lotion applied   Eucerin  -KD      Wrapping Location lower extremity  -KD      Wrapping Location LE bilateral:;foot to knee  -KD      Bandaging Comments Both legs: Tg9, artiflex foot, ankle, and lower leg, Rosidal soft from knee to ankle, gray foam around lower half of lower leg, tubigrip, 1-8cm foot and ankle, 1-10cm from knee down toward ankle, 2-12cm ankle to knee. Tubigrip over all. Cast shoes.  -KD        User Key  (r) = Recorded By, (t) = Taken By, (c) = Cosigned By    Initials Name Provider Type    CHARLEY Acuna, PT Physical Therapist                              PT Assessment/Plan     Row Name 18 1100          PT Assessment    Assessment Comments Note improving mobility & tolerance to standing.  -KD        PT Plan    PT Plan Comments Cont.  -KD       User Key  (r) = Recorded By, (t) = Taken By, (c) = Cosigned By    Initials Name Provider Type    CHARLEY Acuna, PT Physical Therapist                     Exercises     Row Name 18 1000             Subjective Comments    Subjective  Comments States she is able to move better and stand for longer periods of time since doing therapy.  -KD         Subjective Pain    Able to rate subjective pain? yes  -KD      Pre-Treatment Pain Level 6  -KD      Post-Treatment Pain Level 6  -KD      Subjective Pain Comment Pain is 0 at rest, 6 when walking  -KD        User Key  (r) = Recorded By, (t) = Taken By, (c) = Cosigned By    Initials Name Provider Type    CHARLEY Acuna PT Physical Therapist                            Therapy Education  Education Details: Bandaging  Given: Bandaging/dressing change  Program: Reinforced  How Provided: Verbal  Provided to: Patient  Level of Understanding: Verbalized              Time Calculation:   Start Time: 0912  Stop Time: 1033  Time Calculation (min): 81 min  Total Timed Code Minutes- PT: 81 minute(s)     Therapy Charges for Today     Code Description Service Date Service Provider Modifiers Qty    82966553909 HC PT MANUAL THERAPY EA 15 MIN 5/22/2018 Cherelle Acuna, PT GP 5                    Cherelle Acuna, PT  5/22/2018

## 2018-05-23 ENCOUNTER — HOSPITAL ENCOUNTER (OUTPATIENT)
Dept: PHYSICAL THERAPY | Facility: HOSPITAL | Age: 58
Setting detail: THERAPIES SERIES
Discharge: HOME | End: 2018-05-23

## 2018-05-23 DIAGNOSIS — I89.0 LYMPHEDEMA: Primary | ICD-10-CM

## 2018-05-23 PROCEDURE — 97140 MANUAL THERAPY 1/> REGIONS: CPT

## 2018-05-23 NOTE — THERAPY TREATMENT NOTE
Outpatient Physical Therapy Lymphedema Treatment Note   [facility]     Patient Name: Yuliet Ha  : 1960  MRN: [id number]  Today's Date: 2018        Visit Date: 2018    Visit Dx:    ICD-10-CM ICD-9-CM   1. Lymphedema I89.0 457.1       There is no problem list on file for this patient.             Lymphedema     Row Name 18 1000             Subjective Pain    Able to rate subjective pain? yes  -PC      Pre-Treatment Pain Level 6  -PC      Post-Treatment Pain Level 6  -PC      Subjective Pain Comment Pain is 0 at rest, 6 when walking  -PC         Subjective Comments    Subjective Comments States she is sleeping better and walking better.  -PC         Manual Lymphatic Drainage    Manual Lymphatic Drainage Comments B inguinal, B LE's  -PC         Compression/Skin Care    Compression/Skin Care remove bandages  -PC      Skin Care washed/dried;lotion applied   Eucerin  -PC      Wrapping Location lower extremity  -PC      Wrapping Location LE bilateral:;foot to knee  -PC      Bandaging Comments Both legs: Tg9, artiflex foot, ankle, and lower leg, Rosidal soft from knee to ankle, gray foam around lower half of lower leg, tubigrip, 1-8cm foot and ankle, 1-10cm from knee down toward ankle, 2-12cm ankle to knee. Tubigrip over all. Cast shoes.  -PC        User Key  (r) = Recorded By, (t) = Taken By, (c) = Cosigned By    Initials Name Provider Type    PC Hannah Fuentes, PT Physical Therapist                              PT Assessment/Plan     Row Name 18 1023 18 1100       PT Assessment    Assessment Comments Overall decreased pain, increased mobility, and improved sleeping per pt.   -PC Note improving mobility & tolerance to standing.  -KD       PT Plan    PT Plan Comments Cont.  Plan to measure on .  -PC Cont.  -KD      User Key  (r) = Recorded By, (t) = Taken By, (c) = Cosigned By    Initials Name Provider Type    PC Hannah Fuentes, PT Physical Therapist    CHARLEY Acuna,  PT Physical Therapist                     Exercises     Row Name 05/23/18 1000             Subjective Comments    Subjective Comments States she is sleeping better and walking better.  -PC         Subjective Pain    Able to rate subjective pain? yes  -PC      Pre-Treatment Pain Level 6  -PC      Post-Treatment Pain Level 6  -PC      Subjective Pain Comment Pain is 0 at rest, 6 when walking  -PC        User Key  (r) = Recorded By, (t) = Taken By, (c) = Cosigned By    Initials Name Provider Type    PC Hannah Fuentes, PT Physical Therapist                            Therapy Education  Given: Symptoms/condition management  Program: Reinforced  How Provided: Verbal  Provided to: Patient  Level of Understanding: Verbalized              Time Calculation:   Start Time: 0832  Stop Time: 0955  Time Calculation (min): 83 min  Total Timed Code Minutes- PT: 83 minute(s)     Therapy Charges for Today     Code Description Service Date Service Provider Modifiers Qty    [id number] HC PT MANUAL THERAPY EA 15 MIN 5/23/2018 Hannah Fuentes, PT GP 5                    Hannah Fuentes, PT  5/23/2018

## 2018-05-24 ENCOUNTER — HOSPITAL ENCOUNTER (OUTPATIENT)
Dept: PHYSICAL THERAPY | Facility: HOSPITAL | Age: 58
Setting detail: THERAPIES SERIES
Discharge: HOME | End: 2018-05-24

## 2018-05-24 DIAGNOSIS — I89.0 LYMPHEDEMA: Primary | ICD-10-CM

## 2018-05-24 PROCEDURE — 97140 MANUAL THERAPY 1/> REGIONS: CPT

## 2018-05-24 NOTE — THERAPY TREATMENT NOTE
Outpatient Physical Therapy Lymphedema Treatment Note   [facility]     Patient Name: Yuliet Ha  : 1960  MRN: [id number]  Today's Date: 2018        Visit Date: 2018    Visit Dx:    ICD-10-CM ICD-9-CM   1. Lymphedema I89.0 457.1       There is no problem list on file for this patient.             Lymphedema     Row Name 18 1100             Subjective Pain    Able to rate subjective pain? yes  -KD      Pre-Treatment Pain Level 6  -KD      Post-Treatment Pain Level 6  -KD         Subjective Comments    Subjective Comments States she can't see as much of a decrease in edema as she did last week. States she does have some itching on her legs, but not her feet.  -KD         Manual Lymphatic Drainage    Manual Lymphatic Drainage Comments B inguinal, B LE's  -KD         Compression/Skin Care    Compression/Skin Care remove bandages  -KD      Skin Care washed/dried;lotion applied   HCC to lower legs, Eucerin to feet  -KD      Wrapping Location lower extremity  -KD      Wrapping Location LE bilateral:;foot to knee  -KD      Bandaging Comments Both legs: Tg9, artiflex foot, ankle, and lower leg, Rosidal soft from knee to ankle, gray foam around lower half of lower leg, tubigrip, 1-8cm foot and ankle, 1-10cm from knee down toward ankle, 2-12cm ankle to knee. Tubigrip over all. Cast shoes.  -KD        User Key  (r) = Recorded By, (t) = Taken By, (c) = Cosigned By    Initials Name Provider Type    CHARLEY Acuna PT Physical Therapist                              PT Assessment/Plan     Row Name 18 1200          PT Assessment    Assessment Comments Pt progressing toward goals. Added HCC to lower legs today to help with itching.  -KD        PT Plan    PT Plan Comments Cont.  -KD       User Key  (r) = Recorded By, (t) = Taken By, (c) = Cosigned By    Initials Name Provider Type    CHARLEY Acuna PT Physical Therapist                     Exercises     Row Name 18 1100              Subjective Comments    Subjective Comments States she can't see as much of a decrease in edema as she did last week. States she does have some itching on her legs, but not her feet.  -KD         Subjective Pain    Able to rate subjective pain? yes  -KD      Pre-Treatment Pain Level 6  -KD      Post-Treatment Pain Level 6  -KD        User Key  (r) = Recorded By, (t) = Taken By, (c) = Cosigned By    Initials Name Provider Type    CHARLEY Acuna, PT Physical Therapist                            Therapy Education  Education Details: Discussed ankle pumps, walking   Given: Edema management, HEP  Program: Reinforced  How Provided: Verbal  Provided to: Patient  Level of Understanding: Verbalized              Time Calculation:   Start Time: 0829  Stop Time: 0951  Time Calculation (min): 82 min  Total Timed Code Minutes- PT: 82 minute(s)     Therapy Charges for Today     Code Description Service Date Service Provider Modifiers Qty    [id number] HC PT MANUAL THERAPY EA 15 MIN 5/24/2018 Cherelle Acuna, PT GP 5                    Cherelle Acuna PT  5/24/2018

## 2018-05-25 ENCOUNTER — HOSPITAL ENCOUNTER (OUTPATIENT)
Dept: PHYSICAL THERAPY | Facility: HOSPITAL | Age: 58
Setting detail: THERAPIES SERIES
Discharge: HOME | End: 2018-05-25

## 2018-05-25 DIAGNOSIS — I89.0 LYMPHEDEMA: Primary | ICD-10-CM

## 2018-05-25 PROCEDURE — 97140 MANUAL THERAPY 1/> REGIONS: CPT

## 2018-05-25 NOTE — THERAPY TREATMENT NOTE
Outpatient Physical Therapy Lymphedema Treatment Note   [facility]     Patient Name: Yuliet Ha  : 1960  MRN: [id number]  Today's Date: 2018        Visit Date: 2018    Visit Dx:    ICD-10-CM ICD-9-CM   1. Lymphedema I89.0 457.1       There is no problem list on file for this patient.             Lymphedema     Row Name 18 1000 18 1100          Subjective Pain    Able to rate subjective pain? yes  -PC yes  -KD     Pre-Treatment Pain Level 6  -PC 6  -KD     Post-Treatment Pain Level 6  -PC 6  -KD        Subjective Comments    Subjective Comments States bandages stayed up well. Still with some itching.  -PC States she can't see as much of a decrease in edema as she did last week. States she does have some itching on her legs, but not her feet.  -KD        Lymphedema Edema Assessment    Edema Assessment Comment Increased wrinkles in left calf, Tg9 stockinette went on much easier today, indicating decreased fluid.  -PC  --        Skin Changes/Observations    Skin Observations Comment Noted redness ant right leg (skin pinched between rosidal soft layers?). Also noted maceration in skin fold under left calf.  -PC  --        Lymphedema Measurements    Measurement Type(s) Circumferential  -PC  --     Circumferential Areas Lower extremities  -PC  --        LLE Circumferential (cm)    Measurement Location 1 10cm above knee  -PC  --     Left 1 98 cm  -PC  --     Measurement Location 2 Knee  -PC  --     Left 2 60 cm  -PC  --     Measurement Location 3 10cm below knee  -PC  --     Left 3 68 cm  -PC  --     Measurement Location 4 20cm below knee  -PC  --     Left 4 43.5 cm  -PC  --     Measurement Location 5 Ankle  -PC  --     Left 5 23.1 cm  -PC  --     Measurement Location 6 Midfoot  -PC  --     Left 6 24.5 cm  -PC  --     Measurement Location 7 Total  -PC  --     Left 7 317.1 cm  -PC  --     Measurement Location 8 Total decrease   -PC  --     Left 8 -36.1 cm  -PC  --        RLE  Circumferential (cm)    Measurement Location 1 10cm above knee  -PC  --     Right 1 91 cm  -PC  --     Measurement Location 2 Knee  -PC  --     Right 2 54 cm  -PC  --     Measurement Location 3 10cm below knee  -PC  --     Right 3 57 cm  -PC  --     Measurement Location 4 20cm below knee  -PC  --     Right 4 41.2 cm  -PC  --     Measurement Location 5 Ankle  -PC  --     Right 5 22.8 cm  -PC  --     Measurement Location 6 Midfoot  -PC  --     Right 6 23.5 cm  -PC  --     Measurement Location 7 Total  -PC  --     Right 7 289.5 cm  -PC  --     Measurement Location 8 Total decrease  -PC  --     Right 8 -28.5 cm  -PC  --        Manual Lymphatic Drainage    Manual Lymphatic Drainage Comments B inguinal, B LE's  -PC B inguinal, B LE's  -KD        Compression/Skin Care    Compression/Skin Care remove bandages  -PC remove bandages  -KD     Skin Care washed/dried;lotion applied   Zinc oxide in left skin fold, HCC, Eucerin  -PC washed/dried;lotion applied   HCC to lower legs, Eucerin to feet  -KD     Wrapping Location lower extremity  -PC lower extremity  -KD     Wrapping Location LE bilateral:;foot to knee  -PC bilateral:;foot to knee  -KD     Bandaging Comments Both legs: Tg9, artiflex foot, ankle, and lower leg, Rosidal soft from knee to ankle, gray foam around lower half of lower leg, tubigrip, 1-8cm foot and ankle, 1-10cm from knee down toward ankle, 2-12cm ankle to knee. Tubigrip over all. Cast shoes.  -PC Both legs: Tg9, artiflex foot, ankle, and lower leg, Rosidal soft from knee to ankle, gray foam around lower half of lower leg, tubigrip, 1-8cm foot and ankle, 1-10cm from knee down toward ankle, 2-12cm ankle to knee. Tubigrip over all. Cast shoes.  -KD     Compression/Skin Care Comments Added extra cotton over the red spot on right leg.  -PC  --       User Key  (r) = Recorded By, (t) = Taken By, (c) = Cosigned By    Initials Name Provider Type    PC Hannah Fuentes, PT Physical Therapist    KD Cherelle Acuna, PT  Physical Therapist                              PT Assessment/Plan     Row Name 05/25/18 1048 05/24/18 1200       PT Assessment    Assessment Comments Msmts have decreased by total of 28.5cm on right and by 36.1cm on left.  STG #1 and #2 met, others are ongoing and progressing.  -PC Pt progressing toward goals. Added HCC to lower legs today to help with itching.  -KD       PT Plan    PT Plan Comments Pt's  to change bandages over the long holiday weekend. Cont therapy 5/29.  -PC Cont.  -KD      User Key  (r) = Recorded By, (t) = Taken By, (c) = Cosigned By    Initials Name Provider Type    PC Hannah Fuentes, PT Physical Therapist    KD Cherelle Acuna, PT Physical Therapist                     Exercises     Row Name 05/25/18 1000 05/24/18 1100          Subjective Comments    Subjective Comments States bandages stayed up well. Still with some itching.  -PC States she can't see as much of a decrease in edema as she did last week. States she does have some itching on her legs, but not her feet.  -KD        Subjective Pain    Able to rate subjective pain? yes  -PC yes  -KD     Pre-Treatment Pain Level 6  -PC 6  -KD     Post-Treatment Pain Level 6  -PC 6  -KD       User Key  (r) = Recorded By, (t) = Taken By, (c) = Cosigned By    Initials Name Provider Type    PC Hannah Fuentes, PT Physical Therapist    CHARLEY Acuna, PT Physical Therapist                              PT OP Goals     Row Name 05/25/18 1000          PT Short Term Goals    STG Date to Achieve 05/23/18  -PC     STG 1 Pt demo awareness of condition and precautions for improved prevention, management, care of symptoms, and ease of transition to self-care of condition.   -PC     STG 1 Progress Met  -PC     STG 2 Pt/family independent with self-wrapping techniques of compression bandages as indicated for improved self-management of condition.  -PC     STG 2 Progress Progressing  -PC     STG 3 Pt demo decreased net edema of >/=10-15cm for decreased  edema symptoms, decreased risk of infection, and improved skin care.  -PC     STG 3 Progress Met  -        Long Term Goals    LTG Date to Achieve 06/09/18  -PC     LTG 1 Pt/family independent with self-care techniques for self-management of condition.  -PC     LTG 1 Progress Ongoing  -PC     LTG 2 Pt demo decreased net edema of >/=30-40cm for decreased edema symptoms, decreased risk of infection, and improved skin care.  -PC     LTG 2 Progress Goal Revised  -PC     LTG 3 Pt/family independent with compression garments as indicated for self-management of condition.  -PC     LTG 3 Progress Ongoing  -PC       User Key  (r) = Recorded By, (t) = Taken By, (c) = Cosigned By    Initials Name Provider Type    PC Hannah Fuentes, PT Physical Therapist          Therapy Education  Education Details: Discussed trying reduction kit for thigh.  Given: Bandaging/dressing change  Program: Reinforced  How Provided: Verbal, Demonstration  Provided to: Patient  Level of Understanding: Verbalized              Time Calculation:   Start Time: 0830  Stop Time: 0950  Time Calculation (min): 80 min  Total Timed Code Minutes- PT: 80 minute(s)     Therapy Charges for Today     Code Description Service Date Service Provider Modifiers Qty    96596604828 HC PT MANUAL THERAPY EA 15 MIN 5/25/2018 Hannah Fuentes, PT GP 5                    Hannah Fuentes PT  5/25/2018

## 2018-05-29 ENCOUNTER — HOSPITAL ENCOUNTER (OUTPATIENT)
Dept: PHYSICAL THERAPY | Facility: HOSPITAL | Age: 58
Setting detail: THERAPIES SERIES
Discharge: HOME | End: 2018-05-29

## 2018-05-29 DIAGNOSIS — I89.0 LYMPHEDEMA: Primary | ICD-10-CM

## 2018-05-29 PROCEDURE — 97140 MANUAL THERAPY 1/> REGIONS: CPT

## 2018-05-29 NOTE — THERAPY TREATMENT NOTE
Outpatient Physical Therapy Lymphedema Treatment Note   [facility]     Patient Name: Yuliet Ha  : 1960  MRN: [id number]  Today's Date: 2018        Visit Date: 2018    Visit Dx:    ICD-10-CM ICD-9-CM   1. Lymphedema I89.0 457.1       There is no problem list on file for this patient.             Lymphedema     Row Name 18 1000             Subjective Pain    Able to rate subjective pain? yes  -KD      Pre-Treatment Pain Level 6  -KD      Post-Treatment Pain Level 6  -KD         Subjective Comments    Subjective Comments States she'd scratched an itchy area on the front of her right leg using a straw under the bandages. States  rebandaged her legs on .  -KD         Lymphedema Edema Assessment    Edema Assessment Comment NOted abnormal contours of lower legs.  -KD         Skin Changes/Observations    Skin Observations Comment Noted small scratched area ant right lower leg.   -KD         Manual Lymphatic Drainage    Manual Lymphatic Drainage Comments B inguinal, B LE's  -KD         Compression/Skin Care    Compression/Skin Care remove bandages  -KD      Skin Care washed/dried;lotion applied   Zinc oxide in L skin fold, HCC, Eucerin;TOA & Telfa R leg   -KD      Wrapping Location lower extremity  -KD      Wrapping Location LE bilateral:;foot to knee  -KD      Bandaging Comments Both legs: Tg9, artiflex foot, ankle, and lower leg, Rosidal soft from knee to ankle, gray foam around lower half of lower leg, tubigrip, 1-8cm foot and ankle, 1-10cm from knee down toward ankle, 2-12cm ankle to knee. Tubigrip over all. Cast shoes.  -KD      Compression/Skin Care Comments Replaced Ros Soft and thin gray foam (used thicker gray foam).  -KD        User Key  (r) = Recorded By, (t) = Taken By, (c) = Cosigned By    Initials Name Provider Type    CHARLEY Acuna, PT Physical Therapist                              PT Assessment/Plan     Row Name 18 1015          PT Assessment     Assessment Comments Changes in bandaging/skin care today: applied triple antibiotic ointment and a Telfa pad on scratched area of right lower leg today; replaced thin gray foam with thicker foam-feel that it may help fill in the uneven contours better plus the thinner foam appeared to be flattening with use (as with the Ros Soft also). Will monitor tolerance/response to these changes.  -KD        PT Plan    PT Plan Comments Cont QD Mon-Fri  -KD       User Key  (r) = Recorded By, (t) = Taken By, (c) = Cosigned By    Initials Name Provider Type    CHARLEY Acuna PT Physical Therapist                     Exercises     Row Name 05/29/18 1000             Subjective Comments    Subjective Comments States she'd scratched an itchy area on the front of her right leg using a straw under the bandages. States  rebandaged her legs on 5/27.  -KD         Subjective Pain    Able to rate subjective pain? yes  -KD      Pre-Treatment Pain Level 6  -KD      Post-Treatment Pain Level 6  -KD        User Key  (r) = Recorded By, (t) = Taken By, (c) = Cosigned By    Initials Name Provider Type    CHARLEY Acuna PT Physical Therapist                            Therapy Education  Education Details: Bandaging  Given: Bandaging/dressing change  Program: Reinforced  How Provided: Verbal, Demonstration  Provided to: Patient  Level of Understanding: Verbalized              Time Calculation:   Start Time: 0829  Stop Time: 0951  Time Calculation (min): 82 min  Total Timed Code Minutes- PT: 82 minute(s)     Therapy Charges for Today     Code Description Service Date Service Provider Modifiers Qty    02502518241 HC PT MANUAL THERAPY EA 15 MIN 5/29/2018 Cherelle Acuna PT GP 5                    Cherelle Acuna PT  5/29/2018

## 2018-05-30 ENCOUNTER — HOSPITAL ENCOUNTER (OUTPATIENT)
Dept: PHYSICAL THERAPY | Facility: HOSPITAL | Age: 58
Setting detail: THERAPIES SERIES
Discharge: HOME | End: 2018-05-30

## 2018-05-30 DIAGNOSIS — I89.0 LYMPHEDEMA: Primary | ICD-10-CM

## 2018-05-30 PROCEDURE — 97140 MANUAL THERAPY 1/> REGIONS: CPT

## 2018-05-30 NOTE — THERAPY TREATMENT NOTE
Outpatient Physical Therapy Lymphedema Treatment Note   [facility]     Patient Name: Yuliet Ha  : 1960  MRN: [id number]  Today's Date: 2018        Visit Date: 2018    Visit Dx:    ICD-10-CM ICD-9-CM   1. Lymphedema I89.0 457.1       There is no problem list on file for this patient.             Lymphedema     Row Name 18 1300             Subjective Pain    Able to rate subjective pain? yes  -PC      Pre-Treatment Pain Level 6  -PC      Post-Treatment Pain Level 6  -PC      Subjective Pain Comment States her pain is better because she usually hurts a lot more when it rains, but is not today even though it's raining.  -PC         Lymphedema Edema Assessment    Edema Assessment Comment Right bandages slipped down about 3-4 inches.  -PC         Skin Changes/Observations    Skin Observations Comment Scratched area healing, maceration decreased with use of Zinc oxide.  -PC         Manual Lymphatic Drainage    Manual Lymphatic Drainage Comments B inguinal, B LE's  -PC         Compression/Skin Care    Compression/Skin Care remove bandages  -PC      Skin Care washed/dried;lotion applied   Zinc oxide in L skin fold, HCC, Eucerin;TOA & Telfa R leg   -PC      Wrapping Location lower extremity  -PC      Wrapping Location LE bilateral:;foot to knee  -PC      Bandaging Comments Both legs: Tg9, artiflex foot, ankle, and lower leg, Rosidal soft from knee to ankle, gray foam around lower half of lower leg, tubigrip, 1-8cm foot and ankle, 1-10cm from knee down toward ankle, 2-12cm ankle to knee. Tubigrip over all. Cast shoes.  -PC      Compression/Skin Care Comments Put gray foam on before Rosidal soft today.  -PC        User Key  (r) = Recorded By, (t) = Taken By, (c) = Cosigned By    Initials Name Provider Type    PC Hannah Fuentes, PT Physical Therapist                              PT Assessment/Plan     Row Name 18 1323          PT Assessment    Assessment Comments Right bandages still  slipping, pt thinks she rubs her leg getting in/out of car.  Tried putting gray foam on before the Rosidal soft today.  -PC        PT Plan    PT Plan Comments Cont.  -PC       User Key  (r) = Recorded By, (t) = Taken By, (c) = Cosigned By    Initials Name Provider Type    PC Hannah Fuentes PT Physical Therapist                     Exercises     Row Name 05/30/18 1300             Subjective Pain    Able to rate subjective pain? yes  -PC      Pre-Treatment Pain Level 6  -PC      Post-Treatment Pain Level 6  -PC      Subjective Pain Comment States her pain is better because she usually hurts a lot more when it rains, but is not today even though it's raining.  -PC        User Key  (r) = Recorded By, (t) = Taken By, (c) = Cosigned By    Initials Name Provider Type    PC Hannah Fuentes PT Physical Therapist                            Therapy Education  Given: Symptoms/condition management  Program: Reinforced  How Provided: Verbal  Provided to: Patient  Level of Understanding: Verbalized              Time Calculation:   Start Time: 0831  Stop Time: 0955  Time Calculation (min): 84 min  Total Timed Code Minutes- PT: 84 minute(s)     Therapy Charges for Today     Code Description Service Date Service Provider Modifiers Qty    69277988161 HC PT MANUAL THERAPY EA 15 MIN 5/30/2018 Hannah Fuentes, PT GP 5                    Hannah Fuentes PT  5/30/2018

## 2018-05-31 ENCOUNTER — APPOINTMENT (OUTPATIENT)
Dept: PHYSICAL THERAPY | Facility: HOSPITAL | Age: 58
End: 2018-05-31

## 2018-06-01 ENCOUNTER — HOSPITAL ENCOUNTER (OUTPATIENT)
Dept: PHYSICAL THERAPY | Facility: HOSPITAL | Age: 58
Setting detail: THERAPIES SERIES
Discharge: HOME | End: 2018-06-01

## 2018-06-01 DIAGNOSIS — I89.0 LYMPHEDEMA: Primary | ICD-10-CM

## 2018-06-01 PROCEDURE — 97140 MANUAL THERAPY 1/> REGIONS: CPT

## 2018-06-01 NOTE — THERAPY TREATMENT NOTE
Outpatient Physical Therapy Lymphedema Treatment Note  [facility]     Patient Name: Yuliet Ha  : 1960  MRN: [id number]  Today's Date: 2018        Visit Date: 2018    Visit Dx:    ICD-10-CM ICD-9-CM   1. Lymphedema I89.0 457.1       There is no problem list on file for this patient.             Lymphedema     Row Name 18 1600             Subjective Pain    Able to rate subjective pain? yes  -PC      Pre-Treatment Pain Level 5  -PC      Post-Treatment Pain Level 5  -PC         Subjective Comments    Subjective Comments States she was able to walk all the way in from her car to treatment room without resting.  That is a big improvement.  States the bandages on both legs stayed all the way up.  -PC         Lymphedema Edema Assessment    Edema Assessment Comment Bandages on both legs still in place without slipping.  -PC         Skin Changes/Observations    Skin Observations Comment Scratched area healing, maceration decreased with use of Zinc oxide.  -PC         Lymphedema Measurements    Measurement Type(s) Circumferential  -PC      Circumferential Areas Lower extremities  -PC         LLE Circumferential (cm)    Measurement Location 1 10cm above knee  -PC      Left 1 98 cm  -PC      Measurement Location 2 Knee  -PC      Left 2 56 cm  -PC      Measurement Location 3 10cm below knee  -PC      Left 3 67 cm  -PC      Measurement Location 4 20cm below knee  -PC      Left 4 43 cm  -PC      Measurement Location 5 Ankle  -PC      Left 5 23 cm  -PC      Measurement Location 6 Midfoot  -PC      Left 6 24.5 cm  -PC      Measurement Location 7 Total  -PC      Left 7 311.5 cm  -PC      Measurement Location 8 Total decrease   -PC      Left 8 -41.7 cm  -PC         RLE Circumferential (cm)    Measurement Location 1 10cm above knee  -PC      Right 1 91 cm  -PC      Measurement Location 2 Knee  -PC      Right 2 53.5 cm  -PC      Measurement Location 3 10cm below knee  -PC      Right 3 57 cm  -PC       Measurement Location 4 20cm below knee  -PC      Right 4 41.2 cm  -PC      Measurement Location 5 Ankle  -PC      Right 5 22.8 cm  -PC      Measurement Location 6 Midfoot  -PC      Right 6 23 cm  -PC      Measurement Location 7 Total  -PC      Right 7 288.5 cm  -PC      Measurement Location 8 Total decrease  -PC      Right 8 -29.5 cm  -PC         Manual Lymphatic Drainage    Manual Lymphatic Drainage Comments B inguinal, B LE's  -PC         Compression/Skin Care    Compression/Skin Care remove bandages  -PC      Skin Care washed/dried;lotion applied   Zinc oxide in L skin fold, HCC, Eucerin;TOA & Telfa R leg   -PC      Wrapping Location lower extremity  -PC      Wrapping Location LE bilateral:;foot to knee  -PC      Bandaging Comments Both legs: Tg9, artiflex foot, ankle, and lower leg, Gray foam around lower half of lower leg (secured with transelast), Rosidal soft from knee to ankle, tubigrip, 1-8cm foot and ankle, 1-10cm from knee down toward ankle, 2-12cm ankle to knee. Tubigrip over all. Cast shoes.  -PC      Compression/Skin Care Comments Replaced cotton today.  -PC        User Key  (r) = Recorded By, (t) = Taken By, (c) = Cosigned By    Initials Name Provider Type    PC Hannah Fuentes, PT Physical Therapist                              PT Assessment/Plan     Row Name 06/01/18 1706          PT Assessment    Assessment Comments Bandages stayed up well on both legs and lasted 2 days.  Msmts cont to slowly decrease.  The leg contours are improving, and pt can tell a big difference in functional activities.  We have been unable to wrap her thighs due to time contraints and unusual shape, so have recommended a compression pump for home use.  -PC        PT Plan    PT Plan Comments Cont.  -PC       User Key  (r) = Recorded By, (t) = Taken By, (c) = Cosigned By    Initials Name Provider Type    PC Hannah Fuentes, PT Physical Therapist                     Exercises     Row Name 06/01/18 1600             Subjective  Comments    Subjective Comments States she was able to walk all the way in from her car to treatment room without resting.  That is a big improvement.  States the bandages on both legs stayed all the way up.  -PC         Subjective Pain    Able to rate subjective pain? yes  -PC      Pre-Treatment Pain Level 5  -PC      Post-Treatment Pain Level 5  -PC        User Key  (r) = Recorded By, (t) = Taken By, (c) = Cosigned By    Initials Name Provider Type    PC Hannah Fuentes, PT Physical Therapist                            Therapy Education  Education Details: Reveiwed ankle pumps, heel raises, walking, elevation.  Given: Edema management  Program: Reinforced  How Provided: Verbal, Demonstration  Provided to: Patient  Level of Understanding: Verbalized              Time Calculation:   Start Time: 1300  Stop Time: 1423  Time Calculation (min): 83 min  Total Timed Code Minutes- PT: 83 minute(s)     Therapy Charges for Today     Code Description Service Date Service Provider Modifiers Qty    25204034965 HC PT MANUAL THERAPY EA 15 MIN 6/1/2018 Hannah Fuentes, PT GP 5                    Hannah Fuentes, PT  6/1/2018

## 2018-06-04 ENCOUNTER — HOSPITAL ENCOUNTER (OUTPATIENT)
Dept: PHYSICAL THERAPY | Facility: HOSPITAL | Age: 58
Setting detail: THERAPIES SERIES
Discharge: HOME | End: 2018-06-04

## 2018-06-04 DIAGNOSIS — I89.0 LYMPHEDEMA: Primary | ICD-10-CM

## 2018-06-04 PROCEDURE — 97140 MANUAL THERAPY 1/> REGIONS: CPT

## 2018-06-04 NOTE — THERAPY TREATMENT NOTE
Outpatient Physical Therapy Lymphedema Treatment Note  [facility]     Patient Name: Yuliet Ha  : 1960  MRN: [id number]  Today's Date: 2018        Visit Date: 2018    Visit Dx:    ICD-10-CM ICD-9-CM   1. Lymphedema I89.0 457.1       There is no problem list on file for this patient.             Lymphedema     Row Name 18 1000             Subjective Pain    Able to rate subjective pain? yes  -PC      Pre-Treatment Pain Level 5  -PC      Post-Treatment Pain Level 5  -PC      Subjective Pain Comment ) at rest, 5 when walking  -PC         Subjective Comments    Subjective Comments States she went into a store by herself without a shopping cart for the first time in 1-1/2 yrs.  States the bandages have stayed up well.  Her  re-wrapped her yesterday.  -PC         Lymphedema Edema Assessment    Edema Assessment Comment Bandages in place, except coming apart on feet.  -PC         Skin Changes/Observations    Skin Observations Comment Scratched area healing, maceration decreased with use of Zinc oxide.  -PC         Manual Lymphatic Drainage    Manual Lymphatic Drainage Comments B inguinal, B LE's  -PC         Compression/Skin Care    Compression/Skin Care remove bandages  -PC      Skin Care washed/dried;lotion applied   Zinc oxide in L skin fold, HCC, Eucerin;TOA & Telfa R leg   -PC      Wrapping Location lower extremity  -PC      Wrapping Location LE bilateral:;foot to knee  -PC      Bandaging Comments Both legs: Tg9, artiflex foot, ankle, and lower leg, Gray foam around lower half of lower leg (secured with transelast), Rosidal soft from knee to ankle, tubigrip, 1-8cm foot and ankle, 1-10cm from knee down toward ankle, 2-12cm ankle to knee. Tubigrip over all. Cast shoes.  -PC        User Key  (r) = Recorded By, (t) = Taken By, (c) = Cosigned By    Initials Name Provider Type    PC Hannah Fuentes, PT Physical Therapist                              PT Assessment/Plan     Row Name  06/04/18 1042          PT Assessment    Assessment Comments Bandages are staying up better.  Pt cont to note functional improvements.  -PC        PT Plan    PT Plan Comments Cont.  -PC       User Key  (r) = Recorded By, (t) = Taken By, (c) = Cosigned By    Initials Name Provider Type    PC Hannah Fuentes PT Physical Therapist                     Exercises     Row Name 06/04/18 1000             Subjective Comments    Subjective Comments States she went into a store by herself without a shopping cart for the first time in 1-1/2 yrs.  States the bandages have stayed up well.  Her  re-wrapped her yesterday.  -PC         Subjective Pain    Able to rate subjective pain? yes  -PC      Pre-Treatment Pain Level 5  -PC      Post-Treatment Pain Level 5  -PC      Subjective Pain Comment ) at rest, 5 when walking  -PC        User Key  (r) = Recorded By, (t) = Taken By, (c) = Cosigned By    Initials Name Provider Type    PC Hannah Fuentes PT Physical Therapist                            Therapy Education  Education Details: Tried partial squats but they were painful so advised her to start with just lifting her left leg and placing it up on a step.  She was able to do this without pain.  Given: HEP  Program: Reinforced  How Provided: Verbal, Demonstration  Provided to: Patient  Level of Understanding: Verbalized, Demonstrated              Time Calculation:   Start Time: 0837  Stop Time: 1000  Time Calculation (min): 83 min  Total Timed Code Minutes- PT: 83 minute(s)     Therapy Charges for Today     Code Description Service Date Service Provider Modifiers Qty    [id number] HC PT MANUAL THERAPY EA 15 MIN 6/4/2018 Hannah Fuentes, PT GP 5                    Hannah Fuentes PT  6/4/2018

## 2018-06-05 ENCOUNTER — HOSPITAL ENCOUNTER (OUTPATIENT)
Dept: PHYSICAL THERAPY | Facility: HOSPITAL | Age: 58
Setting detail: THERAPIES SERIES
Discharge: HOME | End: 2018-06-05

## 2018-06-05 DIAGNOSIS — I89.0 LYMPHEDEMA: Primary | ICD-10-CM

## 2018-06-05 PROCEDURE — 97140 MANUAL THERAPY 1/> REGIONS: CPT

## 2018-06-05 NOTE — THERAPY TREATMENT NOTE
Outpatient Physical Therapy Lymphedema Treatment Note  [facility]     Patient Name: Yuliet Ha  : 1960  MRN: [id number]  Today's Date: 2018        Visit Date: 2018    Visit Dx:    ICD-10-CM ICD-9-CM   1. Lymphedema I89.0 457.1       There is no problem list on file for this patient.             Lymphedema     Row Name 18 0900             Subjective Pain    Able to rate subjective pain? yes  -KD      Pre-Treatment Pain Level 5  -KD      Post-Treatment Pain Level 5  -KD         Subjective Comments    Subjective Comments States she's a little stiff today, but overall better. Mobility is improving.  -KD         Lymphedema Edema Assessment    Edema Assessment Comment Bandages coming off on left foot, otherwise look good.  -KD         Manual Lymphatic Drainage    Manual Lymphatic Drainage Comments B inguinal, B LE's  -KD         Compression/Skin Care    Compression/Skin Care remove bandages  -KD      Skin Care washed/dried;lotion applied    HCC, Eucerin;TOA & Telfa R leg   -KD      Wrapping Location lower extremity  -KD      Wrapping Location LE bilateral:;foot to knee  -KD      Bandaging Comments Both legs: Tg9, artiflex foot, ankle, and lower leg, Gray foam around lower half of lower leg (secured with transelast), Rosidal soft from knee to ankle, tubigrip, 1-8cm foot and ankle, 1-10cm from knee down toward ankle, 2-12cm ankle to knee. Tubigrip over all.  -KD        User Key  (r) = Recorded By, (t) = Taken By, (c) = Cosigned By    Initials Name Provider Type    CHARLEY Acuna, PT Physical Therapist                              PT Assessment/Plan     Row Name 18 1003 18 1042       PT Assessment    Assessment Comments Tried to secure foot bandages with extra tape today.  -KD Bandages are staying up better.  Pt cont to note functional improvements.  -PC       PT Plan    PT Plan Comments Cont.  -KD Cont.  -PC      User Key  (r) = Recorded By, (t) = Taken By, (c) = Cosigned  By    Initials Name Provider Type    PC Hannah Fuentes, PT Physical Therapist    CHARLEY Acuna PT Physical Therapist                     Exercises     Row Name 06/05/18 0900             Subjective Comments    Subjective Comments States she's a little stiff today, but overall better. Mobility is improving.  -KD         Subjective Pain    Able to rate subjective pain? yes  -KD      Pre-Treatment Pain Level 5  -KD      Post-Treatment Pain Level 5  -KD        User Key  (r) = Recorded By, (t) = Taken By, (c) = Cosigned By    Initials Name Provider Type    CHARLEY Acuna PT Physical Therapist                            Therapy Education  Education Details: Discussed pump, bandaging.  Given: Bandaging/dressing change, Edema management  Program: Reinforced  How Provided: Verbal  Provided to: Patient  Level of Understanding: Verbalized              Time Calculation:   Start Time: 0835  Stop Time: 0950  Time Calculation (min): 75 min     Therapy Charges for Today     Code Description Service Date Service Provider Modifiers Qty    54662448793 HC PT MANUAL THERAPY EA 15 MIN 6/5/2018 Cherelle Acuna, PT GP 5                    Cherelle Acuna PT  6/5/2018

## 2018-06-06 ENCOUNTER — HOSPITAL ENCOUNTER (OUTPATIENT)
Dept: PHYSICAL THERAPY | Facility: HOSPITAL | Age: 58
Setting detail: THERAPIES SERIES
Discharge: HOME | End: 2018-06-06

## 2018-06-06 DIAGNOSIS — I89.0 LYMPHEDEMA: Primary | ICD-10-CM

## 2018-06-06 PROCEDURE — 97140 MANUAL THERAPY 1/> REGIONS: CPT

## 2018-06-06 NOTE — THERAPY TREATMENT NOTE
"    Outpatient Physical Therapy Lymphedema Treatment Note  [facility]     Patient Name: Yuliet Ha  : 1960  MRN: [id number]  Today's Date: 2018        Visit Date: 2018    Visit Dx:    ICD-10-CM ICD-9-CM   1. Lymphedema I89.0 457.1       There is no problem list on file for this patient.             Lymphedema     Row Name 18 1000             Subjective Pain    Able to rate subjective pain? yes  -PC      Pre-Treatment Pain Level 5  -PC      Post-Treatment Pain Level 5  -PC         Subjective Comments    Subjective Comments States she was able to be on her feet for several hours yesterday and was able to cook dinner.  -PC         Lymphedema Edema Assessment    Edema Assessment Comment Bandages slid down approx 2-3\" on both legs  -PC         Manual Lymphatic Drainage    Manual Lymphatic Drainage Comments B inguinal, B LE's  -PC         Compression/Skin Care    Compression/Skin Care remove bandages  -PC      Skin Care washed/dried;lotion applied    HCC, Eucerin;TOA & Telfa R leg   -PC      Wrapping Location lower extremity  -PC      Wrapping Location LE bilateral:;foot to knee  -PC      Bandaging Comments Both legs: Tg9, artiflex foot, ankle, and lower leg, Gray foam around lower half of lower leg (secured with transelast), Rosidal soft from knee to ankle, tubigrip, 1-8cm foot and ankle, 1-10cm from knee down toward ankle, 2-12cm ankle to knee. Tubigrip over all.  -PC        User Key  (r) = Recorded By, (t) = Taken By, (c) = Cosigned By    Initials Name Provider Type     Hannah Fuentes, PT Physical Therapist                              PT Assessment/Plan     Row Name 18 1012          PT Assessment    Assessment Comments Pt cont to note functional improvement.  -PC        PT Plan    PT Plan Comments Cont.  -PC       User Key  (r) = Recorded By, (t) = Taken By, (c) = Cosigned By    Initials Name Provider Type     Hannah Fuentes, PT Physical Therapist                     Exercises "     Row Name 06/06/18 1000             Subjective Comments    Subjective Comments States she was able to be on her feet for several hours yesterday and was able to cook dinner.  -PC         Subjective Pain    Able to rate subjective pain? yes  -PC      Pre-Treatment Pain Level 5  -PC      Post-Treatment Pain Level 5  -PC        User Key  (r) = Recorded By, (t) = Taken By, (c) = Cosigned By    Initials Name Provider Type    PC Hannah Fuentes, PT Physical Therapist                            Therapy Education  Given: Symptoms/condition management  Program: Reinforced  How Provided: Verbal  Provided to: Patient  Level of Understanding: Verbalized              Time Calculation:   Start Time: 0835  Stop Time: 0955  Time Calculation (min): 80 min  Total Timed Code Minutes- PT: 80 minute(s)     Therapy Charges for Today     Code Description Service Date Service Provider Modifiers Qty    86707502992 HC PT MANUAL THERAPY EA 15 MIN 6/6/2018 Hannah Fuentes, PT GP 5                    Hannah Fuentes, PT  6/6/2018

## 2018-06-07 ENCOUNTER — APPOINTMENT (OUTPATIENT)
Dept: PHYSICAL THERAPY | Facility: HOSPITAL | Age: 58
End: 2018-06-07

## 2018-06-08 ENCOUNTER — HOSPITAL ENCOUNTER (OUTPATIENT)
Dept: PHYSICAL THERAPY | Facility: HOSPITAL | Age: 58
Setting detail: THERAPIES SERIES
Discharge: HOME | End: 2018-06-08

## 2018-06-08 DIAGNOSIS — I89.0 LYMPHEDEMA: Primary | ICD-10-CM

## 2018-06-08 PROCEDURE — 97140 MANUAL THERAPY 1/> REGIONS: CPT

## 2018-06-08 NOTE — THERAPY TREATMENT NOTE
"    Outpatient Physical Therapy Lymphedema Progress Note  [facility]     Patient Name: Yuliet Ha  : 1960  MRN: [id number]  Today's Date: 2018        Visit Date: 2018    Visit Dx:    ICD-10-CM ICD-9-CM   1. Lymphedema I89.0 457.1       There is no problem list on file for this patient.             Lymphedema     Row Name 18 1100             Subjective Pain    Able to rate subjective pain? yes  -PC      Pre-Treatment Pain Level 5  -PC      Post-Treatment Pain Level 5  -PC      Subjective Pain Comment States she is tolerating a lot more activity with decreased pain.  -PC         Subjective Comments    Subjective Comments States they had a busy day yesterday and did not have time to change bandages.  Left same ones on from .  Left stayed up, but right slipped down some.  -PC         Lymphedema Edema Assessment    Edema Assessment Comment Right bandages slid down approx 3\".  -PC         Skin Changes/Observations    Skin Observations Comment R scratched area fully healed, scab came off.  -PC         Lymphedema Measurements    Measurement Type(s) Circumferential  -PC      Circumferential Areas Lower extremities  -PC         LLE Circumferential (cm)    Measurement Location 1 10cm above knee  -PC      Left 1 93 cm  -PC      Measurement Location 2 Knee  -PC      Left 2 56 cm  -PC      Measurement Location 3 10cm below knee  -PC      Left 3 67 cm  -PC      Measurement Location 4 20cm below knee  -PC      Left 4 41.5 cm  -PC      Measurement Location 5 Ankle  -PC      Left 5 22.9 cm  -PC      Measurement Location 6 Midfoot  -PC      Left 6 24.1 cm  -PC      Measurement Location 7 Total  -PC      Left 7 304.5 cm  -PC      Measurement Location 8 Total decrease   -PC      Left 8 -48.7 cm  -PC         RLE Circumferential (cm)    Measurement Location 1 10cm above knee  -PC      Right 1 88 cm  -PC      Measurement Location 2 Knee  -PC      Right 2 53.5 cm  -PC      Measurement Location 3 10cm below " knee  -PC      Right 3 57 cm  -PC      Measurement Location 4 20cm below knee  -PC      Right 4 41.2 cm  -PC      Measurement Location 5 Ankle  -PC      Right 5 22.9 cm  -PC      Measurement Location 6 Midfoot  -PC      Right 6 22.5 cm  -PC      Measurement Location 7 Total  -PC      Right 7 285 cm  -PC      Measurement Location 8 Total decrease  -PC      Right 8 -33 cm  -PC         Manual Lymphatic Drainage    Manual Lymphatic Drainage Comments B inguinal, B LE's  -PC         Compression/Skin Care    Compression/Skin Care remove bandages  -PC      Skin Care washed/dried;lotion applied    HCC, Eucerin;TOA (no Telfa  pad today)  -PC      Wrapping Location lower extremity  -PC      Wrapping Location LE bilateral:;foot to knee  -PC      Bandaging Comments Both legs: Tg9, artiflex foot, ankle, and lower leg, Gray foam around lower half of lower leg (secured with transelast), Rosidal soft from knee to ankle, tubigrip, 1-8cm foot and ankle, 1-10cm from knee down toward ankle, 2-12cm ankle to knee. Tubigrip over all.  -PC        User Key  (r) = Recorded By, (t) = Taken By, (c) = Cosigned By    Initials Name Provider Type    PC Hannah Fuentes, PT Physical Therapist                              PT Assessment/Plan     Row Name 06/08/18 1241          PT Assessment    Assessment Comments Overall msmts have decreased by 33cm on right and by 48.7cm on left.  She cont to have a large lobule left calf that is difficult to bandage.  Her leg contours have improved somewhat, but they are still quite difficult to bandage.  She has noted functional improvement such as being able to walk longer distances, being able to stand and do things around the house longer, and being able to go to a store by herself.  A compression pump has been ordered to especially help with her thighs and the lobules. She will need custom garments or velcro compression.  -PC        PT Plan    PT Plan Comments Cont.  -PC       User Key  (r) = Recorded By, (t) =  Taken By, (c) = Cosigned By    Initials Name Provider Type    PC Hannah Fuentes, PT Physical Therapist                     Exercises     Row Name 06/08/18 1100             Subjective Comments    Subjective Comments States they had a busy day yesterday and did not have time to change bandages.  Left same ones on from 6/6.  Left stayed up, but right slipped down some.  -PC         Subjective Pain    Able to rate subjective pain? yes  -PC      Pre-Treatment Pain Level 5  -PC      Post-Treatment Pain Level 5  -PC      Subjective Pain Comment States she is tolerating a lot more activity with decreased pain.  -PC        User Key  (r) = Recorded By, (t) = Taken By, (c) = Cosigned By    Initials Name Provider Type    PC Hannah Fuentes, PT Physical Therapist                              PT OP Goals     Row Name 06/08/18 1200          PT Short Term Goals    STG 1 Pt demo awareness of condition and precautions for improved prevention, management, care of symptoms, and ease of transition to self-care of condition.   -PC     STG 1 Progress Met  -PC     STG 2 Pt/family independent with self-wrapping techniques of compression bandages as indicated for improved self-management of condition.  -PC     STG 2 Progress Met  -PC     STG 2 Progress Comments Pt's  able to wrap her legs fairly well.  -PC     STG 3 Pt demo decreased net edema of >/=10-15cm for decreased edema symptoms, decreased risk of infection, and improved skin care.  -PC     STG 3 Progress Met  -PC     STG 3 Progress Comments Msmts have decreased by 33cm on right and 48.7cm on left.  -PC        Long Term Goals    LTG Date to Achieve 06/22/18  -PC     LTG 1 Pt/family independent with self-care techniques for self-management of condition.  -PC     LTG 1 Progress Ongoing  -PC     LTG 2 Pt demo decreased net edema of >/=40-50cm for decreased edema symptoms, decreased risk of infection, and improved skin care.  -PC     LTG 2 Progress Goal Revised  -PC     LTG 3  Pt/family independent with compression garments as indicated for self-management of condition.  -PC     LTG 3 Progress Ongoing  -PC        Time Calculation    PT Goal Re-Cert Due Date 08/09/18  -PC       User Key  (r) = Recorded By, (t) = Taken By, (c) = Cosigned By    Initials Name Provider Type    PC Hannah Fuentes, PT Physical Therapist          Therapy Education  Given: Edema management  Program: Reinforced  How Provided: Verbal  Provided to: Patient  Level of Understanding: Verbalized              Time Calculation:   Start Time: 0833  Stop Time: 0953  Time Calculation (min): 80 min  Total Timed Code Minutes- PT: 80 minute(s)     Therapy Charges for Today     Code Description Service Date Service Provider Modifiers Qty    88337947925 HC PT MANUAL THERAPY EA 15 MIN 6/8/2018 Hannah Fuentes, PT GP 5                    Hannah Fuentes, PT  6/8/2018

## 2018-06-11 ENCOUNTER — HOSPITAL ENCOUNTER (OUTPATIENT)
Dept: PHYSICAL THERAPY | Facility: HOSPITAL | Age: 58
Setting detail: THERAPIES SERIES
Discharge: HOME | End: 2018-06-11

## 2018-06-11 DIAGNOSIS — I89.0 LYMPHEDEMA: Primary | ICD-10-CM

## 2018-06-11 PROCEDURE — 97140 MANUAL THERAPY 1/> REGIONS: CPT

## 2018-06-11 NOTE — THERAPY TREATMENT NOTE
Outpatient Physical Therapy Lymphedema Treatment Note  [facility]     Patient Name: Yuliet Ha  : 1960  MRN: [id number]  Today's Date: 2018        Visit Date: 2018    Visit Dx:    ICD-10-CM ICD-9-CM   1. Lymphedema I89.0 457.1       There is no problem list on file for this patient.             Lymphedema     Row Name 18 1100             Subjective Pain    Able to rate subjective pain? yes  -PC      Pre-Treatment Pain Level 6  -PC      Post-Treatment Pain Level 6  -PC      Subjective Pain Comment Having a little more pain today due to the rain.  -PC         Subjective Comments    Subjective Comments States she is a  and she was able to attend a meeting with client for the first time in a while. Usually her  meets with clients and she stays home and does the paperwork end.  -PC         Manual Lymphatic Drainage    Manual Lymphatic Drainage Comments B inguinal, B LE's  -PC         Compression/Skin Care    Compression/Skin Care remove bandages  -PC      Skin Care washed/dried;lotion applied    HCC, Eucerin, applied Hydrophor to dry area on right.  -PC      Wrapping Location lower extremity  -PC      Wrapping Location LE bilateral:;foot to knee  -PC      Bandaging Comments Right leg: Tg9, artiflex foot, ankle, and lower leg, Gray foam around lower half of lower leg (secured with transelast), Rosidal soft from gray foam to knee, tubigrip, 1-8cm foot and ankle, 1-10cm foot to calf, 2-12cm ankle to knee. Tubigrip over all. Left: Tg9, artiflex foot and ankle, gray foam lower 1/2 of lower leg (secured with transelast), Rosidal soft knee down to gray foam, tubigrip, 1-8cm foot and ankle, 1-10cm foot to calf, 2-12cm ankle to knee. Covered with tubigrip. Added knee high hose to cover foot and ankle to keep bandages from coming apart in shoes.  -PC        User Key  (r) = Recorded By, (t) = Taken By, (c) = Cosigned By    Initials Name Provider Type    PC Hannah Fuentes,  PT Physical Therapist                              PT Assessment/Plan     Row Name 06/11/18 1130          PT Assessment    Functional Limitations Decreased safety during functional activities  -PC     Assessment Comments Pt cont to do more functional activity.  Tried bandaging more upward from foot today, but on left leg still had to start foam at knee and bandage downward due to the lobule.  -PC        PT Plan    PT Plan Comments Cont.  -PC       User Key  (r) = Recorded By, (t) = Taken By, (c) = Cosigned By    Initials Name Provider Type    PC Hannah Fuentes PT Physical Therapist                     Exercises     Row Name 06/11/18 1100             Subjective Comments    Subjective Comments States she is a  and she was able to attend a meeting with client for the first time in a while. Usually her  meets with clients and she stays home and does the paperwork end.  -PC         Subjective Pain    Able to rate subjective pain? yes  -PC      Pre-Treatment Pain Level 6  -PC      Post-Treatment Pain Level 6  -PC      Subjective Pain Comment Having a little more pain today due to the rain.  -PC        User Key  (r) = Recorded By, (t) = Taken By, (c) = Cosigned By    Initials Name Provider Type    PC Hannah Fuentes, PT Physical Therapist                            Therapy Education  Education Details: Discussed bandaging changes, trying to do more from foot upward to encourage proper direction for fluid movement.  Given: Bandaging/dressing change, Edema management  Program: Reinforced  How Provided: Verbal  Provided to: Patient  Level of Understanding: Verbalized              Time Calculation:   Start Time: 0832  Stop Time: 0952  Time Calculation (min): 80 min  Total Timed Code Minutes- PT: 80 minute(s)     Therapy Charges for Today     Code Description Service Date Service Provider Modifiers Qty    81603744116  PT MANUAL THERAPY EA 15 MIN 6/11/2018 Hannah Fuentes, PT GP 5                     Hannah Fuentes, PT  6/11/2018

## 2018-06-12 ENCOUNTER — HOSPITAL ENCOUNTER (OUTPATIENT)
Dept: PHYSICAL THERAPY | Facility: HOSPITAL | Age: 58
Setting detail: THERAPIES SERIES
Discharge: HOME | End: 2018-06-12

## 2018-06-12 DIAGNOSIS — I89.0 LYMPHEDEMA: Primary | ICD-10-CM

## 2018-06-12 PROCEDURE — 97140 MANUAL THERAPY 1/> REGIONS: CPT

## 2018-06-12 NOTE — THERAPY TREATMENT NOTE
"    Outpatient Physical Therapy Lymphedema Treatment Note  [facility]     Patient Name: Yuliet Ha  : 1960  MRN: [id number]  Today's Date: 2018        Visit Date: 2018    Visit Dx:    ICD-10-CM ICD-9-CM   1. Lymphedema I89.0 457.1       There is no problem list on file for this patient.             Lymphedema     Row Name 18 0900 18 1100          Subjective Pain    Able to rate subjective pain? yes  -KD yes  -PC     Pre-Treatment Pain Level 6  -KD 6  -PC     Post-Treatment Pain Level 6  -KD 6  -PC     Subjective Pain Comment Some discomfort left lower leg  -KD Having a little more pain today due to the rain.  -PC        Subjective Comments    Subjective Comments States the bandages felt really tight on the left leg, but stayed up well. States the bandages stayed on much better around her feet because of the knee-high hose.  -KD States she is a  and she was able to attend a meeting with client for the first time in a while. Usually her  meets with clients and she stays home and does the paperwork end.  -PC        Skin Changes/Observations    Skin Observations Comment Noted a raised area medial lower left leg near lobule-may have gotten \"pinched\" in the foam  -KD  --        Manual Lymphatic Drainage    Manual Lymphatic Drainage Comments B inguinal, B LE's  -KD B inguinal, B LE's  -PC        Compression/Skin Care    Compression/Skin Care remove bandages  -KD remove bandages  -PC     Skin Care washed/dried;lotion applied    HCC, Eucerin  -KD washed/dried;lotion applied    HCC, Eucerin, applied Hydrophor to dry area on right.  -PC     Wrapping Location lower extremity  -KD lower extremity  -PC     Wrapping Location LE bilateral:;foot to knee  -KD bilateral:;foot to knee  -PC     Bandaging Comments Right leg: Tg9, artiflex foot, ankle, and lower leg, Gray foam around lower half of lower leg (secured with transelast), Rosidal soft from gray foam to knee, " tubigrip, 1-8cm foot and ankle, 1-10cm foot to calf, 2-12cm ankle to knee. Tubigrip over all. Left: Tg9, artiflex foot and ankle (extra artiflex ankle & lower leg), gray foam lower 1/2 of lower leg (secured with transelast), Rosidal soft knee down to gray foam, tubigrip, 1-8cm foot and ankle, 1-10cm foot to calf, 2-12cm ankle to knee. Covered with tubigrip. Added knee high hose to cover foot and ankle to keep bandages from coming apart in shoes  -KD Right leg: Tg9, artiflex foot, ankle, and lower leg, Gray foam around lower half of lower leg (secured with transelast), Rosidal soft from gray foam to knee, tubigrip, 1-8cm foot and ankle, 1-10cm foot to calf, 2-12cm ankle to knee. Tubigrip over all. Left: Tg9, artiflex foot and ankle, gray foam lower 1/2 of lower leg (secured with transelast), Rosidal soft knee down to gray foam, tubigrip, 1-8cm foot and ankle, 1-10cm foot to calf, 2-12cm ankle to knee. Covered with tubigrip. Added knee high hose to cover foot and ankle to keep bandages from coming apart in shoes.  -PC       User Key  (r) = Recorded By, (t) = Taken By, (c) = Cosigned By    Initials Name Provider Type    PC Hannah Fuentes, PT Physical Therapist     Cherelle Acuna, PT Physical Therapist                              PT Assessment/Plan     Row Name 06/12/18 0951 06/11/18 1130       PT Assessment    Functional Limitations  -- Decreased safety during functional activities  -PC    Assessment Comments Adding knee high hose helped keep bandages on feet better. Added extra Artiflex padding today to left lower leg to see if that helps with discomfort.  -KD Pt cont to do more functional activity.  Tried bandaging more upward from foot today, but on left leg still had to start foam at knee and bandage downward due to the lobule.  -PC       PT Plan    PT Plan Comments Cont  -KD Cont.  -PC      User Key  (r) = Recorded By, (t) = Taken By, (c) = Cosigned By    Initials Name Provider Type    PC Hannah Fuentes, PT  Physical Therapist    CHARLEY Acuna, PT Physical Therapist                     Exercises     Row Name 06/12/18 0900 06/11/18 1100          Subjective Comments    Subjective Comments States the bandages felt really tight on the left leg, but stayed up well. States the bandages stayed on much better around her feet because of the knee-high hose.  -KD States she is a  and she was able to attend a meeting with client for the first time in a while. Usually her  meets with clients and she stays home and does the paperwork end.  -PC        Subjective Pain    Able to rate subjective pain? yes  -KD yes  -PC     Pre-Treatment Pain Level 6  -KD 6  -PC     Post-Treatment Pain Level 6  -KD 6  -PC     Subjective Pain Comment Some discomfort left lower leg  -KD Having a little more pain today due to the rain.  -PC       User Key  (r) = Recorded By, (t) = Taken By, (c) = Cosigned By    Initials Name Provider Type    PC Hannah Fuentes, SERVANDO Physical Therapist    CHARLEY Acuna PT Physical Therapist                            Therapy Education  Education Details: Further discussed bandaging changes.  Given: Bandaging/dressing change, Edema management  Program: Reinforced  How Provided: Verbal, Demonstration  Provided to: Patient  Level of Understanding: Verbalized              Time Calculation:   Start Time: 0828  Stop Time: 0943  Time Calculation (min): 75 min  Total Timed Code Minutes- PT: 75 minute(s)   Therapy Suggested Charges     Code   Minutes Charges    None           Therapy Charges for Today     Code Description Service Date Service Provider Modifiers Qty    [id number] HC PT MANUAL THERAPY EA 15 MIN 6/12/2018 Cherelle Acuna, PT GP 5                    Cherelle Acuna PT  6/12/2018

## 2018-06-13 ENCOUNTER — HOSPITAL ENCOUNTER (OUTPATIENT)
Dept: PHYSICAL THERAPY | Facility: HOSPITAL | Age: 58
Setting detail: THERAPIES SERIES
Discharge: HOME | End: 2018-06-13

## 2018-06-13 DIAGNOSIS — I89.0 LYMPHEDEMA: Primary | ICD-10-CM

## 2018-06-13 PROCEDURE — 97140 MANUAL THERAPY 1/> REGIONS: CPT

## 2018-06-13 NOTE — THERAPY TREATMENT NOTE
"    Outpatient Physical Therapy Lymphedema Treatment Note  [facility]     Patient Name: Yuliet Ha  : 1960  MRN: [id number]  Today's Date: 2018        Visit Date: 2018    Visit Dx:    ICD-10-CM ICD-9-CM   1. Lymphedema I89.0 457.1       There is no problem list on file for this patient.             Lymphedema     Row Name 18 1300             Subjective Pain    Able to rate subjective pain? yes  -PC      Pre-Treatment Pain Level 6  -PC      Post-Treatment Pain Level 6  -PC         Subjective Comments    Subjective Comments States the bandges slid down some.  -PC         Skin Changes/Observations    Skin Observations Comment No redness or irritated areas today. Skin is dry. Bandages slipped about 5\" on right and 2\" on left.  -PC         Manual Lymphatic Drainage    Manual Lymphatic Drainage Comments B inguinal, B LE's  -PC         Compression/Skin Care    Compression/Skin Care remove bandages  -PC      Skin Care washed/dried;lotion applied    HCC, Hydrophor  -PC      Wrapping Location lower extremity  -PC      Wrapping Location LE bilateral:;foot to knee  -PC      Bandaging Comments Both legs: Tg9, artiflex foot, ankle, and lower leg, Gray foam around lower half of lower leg (secured with transelast), Rosidal soft from knee to gray foam, tubigrip, 1-8cm foot and ankle, 1-10cm foot to calf, 2-12cm ankle to knee. Tubigrip over all, knee high hose to cover foot and ankle to keep bandages from coming apart in shoes  -PC        User Key  (r) = Recorded By, (t) = Taken By, (c) = Cosigned By    Initials Name Provider Type     Hannah Fuentes, PT Physical Therapist                              PT Assessment/Plan     Row Name 18 1323          PT Assessment    Assessment Comments SKin seemed more dry today so used Hydrophor and HCC.    -PC        PT Plan    PT Plan Comments Cont.  -PC       User Key  (r) = Recorded By, (t) = Taken By, (c) = Cosigned By    Initials Name Provider Type    PC " Hannah Fuentes, PT Physical Therapist                     Exercises     Row Name 06/13/18 1300             Subjective Comments    Subjective Comments States the bandges slid down some.  -PC         Subjective Pain    Able to rate subjective pain? yes  -PC      Pre-Treatment Pain Level 6  -PC      Post-Treatment Pain Level 6  -PC        User Key  (r) = Recorded By, (t) = Taken By, (c) = Cosigned By    Initials Name Provider Type    PC Hannah Fuentes PT Physical Therapist                            Therapy Education  Given: Edema management  Program: Reinforced  How Provided: Verbal  Provided to: Patient  Level of Understanding: Verbalized              Time Calculation:   Start Time: 0832  Stop Time: 0954  Time Calculation (min): 82 min  Total Timed Code Minutes- PT: 82 minute(s)   Therapy Suggested Charges     Code   Minutes Charges    None           Therapy Charges for Today     Code Description Service Date Service Provider Modifiers Qty    98926387808 HC PT MANUAL THERAPY EA 15 MIN 6/13/2018 Hannah Fuentes, PT GP 5                    Hannah Fuentes, PT  6/13/2018

## 2018-06-14 ENCOUNTER — HOSPITAL ENCOUNTER (OUTPATIENT)
Dept: PHYSICAL THERAPY | Facility: HOSPITAL | Age: 58
Setting detail: THERAPIES SERIES
Discharge: HOME | End: 2018-06-14

## 2018-06-14 DIAGNOSIS — I89.0 LYMPHEDEMA: Primary | ICD-10-CM

## 2018-06-14 PROCEDURE — 97140 MANUAL THERAPY 1/> REGIONS: CPT

## 2018-06-14 NOTE — THERAPY TREATMENT NOTE
"    Outpatient Physical Therapy Lymphedema Treatment Note  [facility]     Patient Name: Yuliet Ha  : 1960  MRN: [id number]  Today's Date: 2018        Visit Date: 2018    Visit Dx:    ICD-10-CM ICD-9-CM   1. Lymphedema I89.0 457.1       There is no problem list on file for this patient.             Lymphedema     Row Name 18 0900 18 1300          Subjective Pain    Able to rate subjective pain? yes  -KD yes  -PC     Pre-Treatment Pain Level 6  -KD 6  -PC     Post-Treatment Pain Level 6  -KD 6  -PC        Subjective Comments    Subjective Comments States the itching is better than it was.  -KD States the bandges slid down some.  -PC        Skin Changes/Observations    Skin Observations Comment  -- No redness or irritated areas today. Skin is dry. Bandages slipped about 5\" on right and 2\" on left.  -PC        Manual Lymphatic Drainage    Manual Lymphatic Drainage Comments B inguinal, B LE's  -KD B inguinal, B LE's  -PC        Compression/Skin Care    Compression/Skin Care remove bandages  -KD remove bandages  -PC     Skin Care washed/dried;lotion applied    HCC, Hydrophor  -KD washed/dried;lotion applied    HCC, Hydrophor  -PC     Wrapping Location lower extremity  -KD lower extremity  -PC     Wrapping Location LE bilateral:;foot to knee  -KD bilateral:;foot to knee  -PC     Bandaging Comments Both legs: Tg9, artiflex foot, ankle, and lower leg, Gray foam around lower half of lower leg (secured with transelast), Rosidal soft from knee to gray foam, tubigrip, 1-8cm foot and ankle, 1-10cm foot to calf, 2-12cm ankle to knee. Tubigrip over all, knee high hose to cover foot and ankle to keep bandages from coming apart in shoes  -KD Both legs: Tg9, artiflex foot, ankle, and lower leg, Gray foam around lower half of lower leg (secured with transelast), Rosidal soft from knee to gray foam, tubigrip, 1-8cm foot and ankle, 1-10cm foot to calf, 2-12cm ankle to knee. Tubigrip over all, knee " high hose to cover foot and ankle to keep bandages from coming apart in shoes  -PC       User Key  (r) = Recorded By, (t) = Taken By, (c) = Cosigned By    Initials Name Provider Type    PC Hannah Fuentes, PT Physical Therapist    CHARLEY Acuna, PT Physical Therapist                              PT Assessment/Plan     Row Name 06/14/18 0952 06/13/18 1323       PT Assessment    Assessment Comments Itching improving.  -KD SKin seemed more dry today so used Hydrophor and HCC.    -PC       PT Plan    PT Plan Comments Cont.  -KD Cont.  -PC      User Key  (r) = Recorded By, (t) = Taken By, (c) = Cosigned By    Initials Name Provider Type    PC Hannah Fuentes, PT Physical Therapist    CHARLEY Acuna, PT Physical Therapist                     Exercises     Row Name 06/14/18 0954 06/14/18 0900 06/13/18 1300       Subjective Comments    Subjective Comments  -- States the itching is better than it was.  -KD States the bandges slid down some.  -PC       Subjective Pain    Able to rate subjective pain?  -- yes  -KD yes  -PC    Pre-Treatment Pain Level  -- 6  -KD 6  -PC    Post-Treatment Pain Level  -- 6  -KD 6  -PC       Total Minutes    85802 - PT Manual Therapy Minutes 79  -KD  --  --      User Key  (r) = Recorded By, (t) = Taken By, (c) = Cosigned By    Initials Name Provider Type    PC Hannah Fuentes, PT Physical Therapist    CHARLEY Acuna, PT Physical Therapist                            Therapy Education  Education Details: Discussed pump usage.  Given: Edema management  Program: Reinforced  How Provided: Verbal  Provided to: Patient  Level of Understanding: Verbalized              Time Calculation:   Start Time: 0829  Stop Time: 0948  Time Calculation (min): 79 min  Total Timed Code Minutes- PT: 79 minute(s)   Therapy Suggested Charges     Code   Minutes Charges    48284 (CPT®) Hc Pt Neuromusc Re Education Ea 15 Min      40763 (CPT®) Hc Pt Ther Proc Ea 15 Min      89863 (CPT®) Hc Gait Training Ea 15 Min       42708 (CPT®) Hc Pt Therapeutic Act Ea 15 Min      98071 (CPT®) Hc Pt Manual Therapy Ea 15 Min 79 5    19890 (CPT®) Hc Pt Ther Massage- Per 15 Min      10645 (CPT®) Hc Pt Iontophoresis Ea 15 Min      20347 (CPT®) Hc Pt Elec Stim Ea-Per 15 Min      02545 (CPT®) Hc Pt Ultrasound Ea 15 Min      61240 (CPT®) Hc Pt Self Care/Mgmt/Train Ea 15 Min      Total  79 5        Therapy Charges for Today     Code Description Service Date Service Provider Modifiers Qty    10081346999 HC PT MANUAL THERAPY EA 15 MIN 6/14/2018 Cherelle Acuna, PT GP 5                    Cherelle Acuna, PT  6/14/2018

## 2018-06-15 ENCOUNTER — HOSPITAL ENCOUNTER (OUTPATIENT)
Dept: PHYSICAL THERAPY | Facility: HOSPITAL | Age: 58
Setting detail: THERAPIES SERIES
Discharge: HOME | End: 2018-06-15

## 2018-06-15 DIAGNOSIS — I89.0 LYMPHEDEMA: Primary | ICD-10-CM

## 2018-06-15 PROCEDURE — 97140 MANUAL THERAPY 1/> REGIONS: CPT

## 2018-06-15 NOTE — THERAPY TREATMENT NOTE
Outpatient Physical Therapy Lymphedema Treatment Note  [facility]     Patient Name: Yuleit Ha  : 1960  MRN: [id number]  Today's Date: 6/15/2018        Visit Date: 06/15/2018    Visit Dx:    ICD-10-CM ICD-9-CM   1. Lymphedema I89.0 457.1       There is no problem list on file for this patient.             Lymphedema     Row Name 06/15/18 1300             Subjective Pain    Able to rate subjective pain? yes  -PC      Pre-Treatment Pain Level 5  -PC      Post-Treatment Pain Level 5  -PC         Subjective Comments    Subjective Comments Reports no itching yesterday or today until bandages were off.  -PC         Skin Changes/Observations    Skin Observations Comment Skin dry  -PC         Lymphedema Measurements    Measurement Type(s) Circumferential  -PC      Circumferential Areas Lower extremities  -PC         LLE Circumferential (cm)    Measurement Location 1 10cm above knee  -PC      Left 1 93 cm  -PC      Measurement Location 2 Knee  -PC      Left 2 56 cm  -PC      Measurement Location 3 10cm below knee  -PC      Left 3 67 cm  -PC      Measurement Location 4 20cm below knee  -PC      Left 4 41.5 cm  -PC      Measurement Location 5 Ankle  -PC      Left 5 22.9 cm  -PC      Measurement Location 6 Midfoot  -PC      Left 6 24 cm  -PC      Measurement Location 7 Total  -PC      Left 7 304.4 cm  -PC      Measurement Location 8 Total decrease   -PC      Left 8 -48.8 cm  -PC         RLE Circumferential (cm)    Measurement Location 1 10cm above knee  -PC      Right 1 88 cm  -PC      Measurement Location 2 Knee  -PC      Right 2 53.5 cm  -PC      Measurement Location 3 10cm below knee  -PC      Right 3 57 cm  -PC      Measurement Location 4 20cm below knee  -PC      Right 4 41.2 cm  -PC      Measurement Location 5 Ankle  -PC      Right 5 22.6 cm  -PC      Measurement Location 6 Midfoot  -PC      Right 6 23 cm  -PC      Measurement Location 7 Total  -PC      Right 7 285.3 cm  -PC      Measurement Location 8  Total decrease  -PC      Right 8 -32.7 cm  -PC         Manual Lymphatic Drainage    Manual Lymphatic Drainage Comments B inguinal, B LE's  -PC         Compression/Skin Care    Compression/Skin Care remove bandages  -PC      Skin Care washed/dried;lotion applied    HCC, Hydrophor  -PC      Wrapping Location lower extremity  -PC      Wrapping Location LE bilateral:;foot to knee  -PC      Bandaging Comments Both legs: Tg9, artiflex foot, ankle, and lower leg, Gray foam around lower half of lower leg (secured with transelast), Rosidal soft from knee to gray foam, tubigrip, 1-8cm foot and ankle, 1-10cm foot to calf, 2-12cm ankle to knee. Tubigrip over all, knee high hose to cover foot and ankle to keep bandages from coming apart in shoes  -PC        User Key  (r) = Recorded By, (t) = Taken By, (c) = Cosigned By    Initials Name Provider Type    PC Hannah Fuentes, PT Physical Therapist                              PT Assessment/Plan     Row Name 06/15/18 1323          PT Assessment    Assessment Comments Pt's msmts are starting to plateau. She cont to note functional improvement, however. She was instructed to make appt with certified fitter for custom stockings.  -PC        PT Plan    PT Plan Comments Cont therapy, have pt fitted for custom garments.  Pt should be getting compression pump for home use as well.  -PC       User Key  (r) = Recorded By, (t) = Taken By, (c) = Cosigned By    Initials Name Provider Type    PC Hannah Fuentes, PT Physical Therapist                     Exercises     Row Name 06/15/18 1326 06/15/18 1300          Subjective Comments    Subjective Comments  -- Reports no itching yesterday or today until bandages were off.  -PC        Subjective Pain    Able to rate subjective pain?  -- yes  -PC     Pre-Treatment Pain Level  -- 5  -PC     Post-Treatment Pain Level  -- 5  -PC        Total Minutes    81088 - PT Manual Therapy Minutes 85  -PC  --       User Key  (r) = Recorded By, (t) = Taken By, (c) =  Cosigned By    Initials Name Provider Type    PC Hannah Fuentes, PT Physical Therapist                            Therapy Education  Education Details: Discussed compression stockings.  Pt will need custom made stockings.  Recommended [employer] or [employer] strong.   Given: Edema management  Program: Reinforced  How Provided: Verbal  Provided to: Patient  Level of Understanding: Verbalized              Time Calculation:   Start Time: 0835  Stop Time: 1000  Time Calculation (min): 85 min  Total Timed Code Minutes- PT: 85 minute(s)   Therapy Suggested Charges     Code   Minutes Charges    75923 (CPT®) Hc Pt Neuromusc Re Education Ea 15 Min      87771 (CPT®) Hc Pt Ther Proc Ea 15 Min      87566 (CPT®) Hc Gait Training Ea 15 Min      62638 (CPT®) Hc Pt Therapeutic Act Ea 15 Min      20341 (CPT®) Hc Pt Manual Therapy Ea 15 Min 85 6    83564 (CPT®) Hc Pt Ther Massage- Per 15 Min      56051 (CPT®) Hc Pt Iontophoresis Ea 15 Min      16615 (CPT®) Hc Pt Elec Stim Ea-Per 15 Min      59250 (CPT®) Hc Pt Ultrasound Ea 15 Min      10417 (CPT®) Hc Pt Self Care/Mgmt/Train Ea 15 Min      Total  85 6        Therapy Charges for Today     Code Description Service Date Service Provider Modifiers Qty    26423977333 HC PT MANUAL THERAPY EA 15 MIN 6/15/2018 Hannah Fuentes, PT GP 5                    Hannah Fuentes, PT  6/15/2018

## 2018-06-18 ENCOUNTER — HOSPITAL ENCOUNTER (OUTPATIENT)
Dept: PHYSICAL THERAPY | Facility: HOSPITAL | Age: 58
Setting detail: THERAPIES SERIES
Discharge: HOME | End: 2018-06-18

## 2018-06-18 DIAGNOSIS — I89.0 LYMPHEDEMA: Primary | ICD-10-CM

## 2018-06-18 PROCEDURE — 97140 MANUAL THERAPY 1/> REGIONS: CPT

## 2018-06-18 NOTE — THERAPY TREATMENT NOTE
Outpatient Physical Therapy Lymphedema Treatment Note  [facility]     Patient Name: Yuliet Ha  : 1960  MRN: [id number]  Today's Date: 2018        Visit Date: 2018    Visit Dx:    ICD-10-CM ICD-9-CM   1. Lymphedema I89.0 457.1       There is no problem list on file for this patient.             Lymphedema     Row Name 18 1200             Subjective Pain    Able to rate subjective pain? yes  -PC      Pre-Treatment Pain Level 5  -PC      Post-Treatment Pain Level 5  -PC         Subjective Comments    Subjective Comments States bandages stayed up well and stayed on all weekend.  -PC         Manual Lymphatic Drainage    Manual Lymphatic Drainage Comments B inguinal, B LE's  -PC         Compression/Skin Care    Compression/Skin Care remove bandages  -PC      Skin Care washed/dried;lotion applied    HCC, Hydrophor  -PC      Wrapping Location lower extremity  -PC      Wrapping Location LE bilateral:;foot to knee  -PC      Bandaging Comments Both legs: Tg9, artiflex foot, ankle, and lower leg, Gray foam around lower half of lower leg (secured with transelast), Rosidal soft from knee to gray foam, tubigrip, 1-8cm foot and ankle, 1-10cm foot to calf, 2-12cm ankle to knee. Tubigrip over all, knee high hose to cover foot and ankle to keep bandages from coming apart in shoes  -PC        User Key  (r) = Recorded By, (t) = Taken By, (c) = Cosigned By    Initials Name Provider Type    PC Hannah Fuentes, PT Physical Therapist                              PT Assessment/Plan     Row Name 18 1248          PT Assessment    Assessment Comments Pt's bandages stayed up/on all weekend.  She has appt with  this week.  -PC        PT Plan    PT Plan Comments Cont.  -PC       User Key  (r) = Recorded By, (t) = Taken By, (c) = Cosigned By    Initials Name Provider Type    PC Hannah Fuentes, PT Physical Therapist                     Exercises     Row Name 18 1250 18 1200           Subjective Comments    Subjective Comments  -- States bandages stayed up well and stayed on all weekend.  -PC        Subjective Pain    Able to rate subjective pain?  -- yes  -PC     Pre-Treatment Pain Level  -- 5  -PC     Post-Treatment Pain Level  -- 5  -PC        Total Minutes    81881 - PT Manual Therapy Minutes 79  -PC  --       User Key  (r) = Recorded By, (t) = Taken By, (c) = Cosigned By    Initials Name Provider Type    PC Hannah Fuentes, PT Physical Therapist                            Therapy Education  Education Details: Discussed air travel and precautions.  Given: Symptoms/condition management  Program: Reinforced  How Provided: Verbal  Provided to: Patient  Level of Understanding: Verbalized              Time Calculation:   Start Time: 0831  Stop Time: 0950  Time Calculation (min): 79 min  Total Timed Code Minutes- PT: 79 minute(s)   Therapy Suggested Charges     Code   Minutes Charges    07000 (CPT®) Hc Pt Neuromusc Re Education Ea 15 Min      46358 (CPT®) Hc Pt Ther Proc Ea 15 Min      73238 (CPT®) Hc Gait Training Ea 15 Min      71931 (CPT®) Hc Pt Therapeutic Act Ea 15 Min      66802 (CPT®) Hc Pt Manual Therapy Ea 15 Min 79 5    10149 (CPT®) Hc Pt Ther Massage- Per 15 Min      23235 (CPT®) Hc Pt Iontophoresis Ea 15 Min      08635 (CPT®) Hc Pt Elec Stim Ea-Per 15 Min      32069 (CPT®) Hc Pt Ultrasound Ea 15 Min      45345 (CPT®) Hc Pt Self Care/Mgmt/Train Ea 15 Min      Total  79 5        Therapy Charges for Today     Code Description Service Date Service Provider Modifiers Qty    66304294747 HC PT MANUAL THERAPY EA 15 MIN 6/18/2018 Hannah Fuentes, PT GP 5                    Hannah Fuentes, PT  6/18/2018

## 2018-06-19 ENCOUNTER — HOSPITAL ENCOUNTER (OUTPATIENT)
Dept: PHYSICAL THERAPY | Facility: HOSPITAL | Age: 58
Setting detail: THERAPIES SERIES
Discharge: HOME | End: 2018-06-19

## 2018-06-19 DIAGNOSIS — I89.0 LYMPHEDEMA: Primary | ICD-10-CM

## 2018-06-19 PROCEDURE — 97140 MANUAL THERAPY 1/> REGIONS: CPT

## 2018-06-19 NOTE — THERAPY TREATMENT NOTE
Outpatient Physical Therapy Lymphedema Treatment Note  [facility]     Patient Name: Yuliet Ha  : 1960  MRN: [id number]  Today's Date: 2018        Visit Date: 2018    Visit Dx:    ICD-10-CM ICD-9-CM   1. Lymphedema I89.0 457.1       There is no problem list on file for this patient.             Lymphedema     Row Name 18 1100 18 1200          Subjective Pain    Able to rate subjective pain? yes  -KD yes  -PC     Pre-Treatment Pain Level 5  -KD 5  -PC     Post-Treatment Pain Level 5  -KD 5  -PC        Subjective Comments    Subjective Comments Some itching right leg.  -KD States bandages stayed up well and stayed on all weekend.  -PC        Manual Lymphatic Drainage    Manual Lymphatic Drainage Comments B inguinal, B LE's  -KD B inguinal, B LE's  -PC        Compression/Skin Care    Compression/Skin Care remove bandages  -KD remove bandages  -PC     Skin Care washed/dried;lotion applied    HCC, Hydrophor, small amount of zinc oxide R lower leg  -KD washed/dried;lotion applied    HCC, Hydrophor  -PC     Wrapping Location lower extremity  -KD lower extremity  -PC     Wrapping Location LE bilateral:;foot to knee  -KD bilateral:;foot to knee  -PC     Bandaging Comments Both legs: Tg9, artiflex foot, ankle, and lower leg, Gray foam around lower half of lower leg (secured with transelast), Rosidal soft from knee to gray foam, tubigrip, 1-8cm foot and ankle, 1-10cm foot to calf, 2-12cm ankle to knee. Tubigrip over all, knee high hose to cover foot and ankle to keep bandages from coming apart in shoes  -KD Both legs: Tg9, artiflex foot, ankle, and lower leg, Gray foam around lower half of lower leg (secured with transelast), Rosidal soft from knee to gray foam, tubigrip, 1-8cm foot and ankle, 1-10cm foot to calf, 2-12cm ankle to knee. Tubigrip over all, knee high hose to cover foot and ankle to keep bandages from coming apart in shoes  -PC       User Key  (r) = Recorded By, (t) =  Taken By, (c) = Cosigned By    Initials Name Provider Type    PC Hannah Fuentes, PT Physical Therapist    CHARLEY Acuna, PT Physical Therapist                              PT Assessment/Plan     Row Name 06/19/18 1135 06/18/18 1248       PT Assessment    Assessment Comments Some itching right lower leg today even with HCC. Will  monitor, may need to switch stockinette.  -KD Pt's bandages stayed up/on all weekend.  She has appt with  this week.  -PC       PT Plan    PT Plan Comments Cont.  -KD Cont.  -PC      User Key  (r) = Recorded By, (t) = Taken By, (c) = Cosigned By    Initials Name Provider Type    PC Hannah Fuentes, PT Physical Therapist    CHARLEY Acuna, PT Physical Therapist                     Exercises     Row Name 06/19/18 1137 06/19/18 1100 06/18/18 1250       Subjective Comments    Subjective Comments  -- Some itching right leg.  -KD  --       Subjective Pain    Able to rate subjective pain?  -- yes  -KD  --    Pre-Treatment Pain Level  -- 5  -KD  --    Post-Treatment Pain Level  -- 5  -KD  --       Total Minutes    54607 - PT Manual Therapy Minutes 81  -KD  -- 79  -PC    Row Name 06/18/18 1200             Subjective Comments    Subjective Comments States bandages stayed up well and stayed on all weekend.  -PC         Subjective Pain    Able to rate subjective pain? yes  -PC      Pre-Treatment Pain Level 5  -PC      Post-Treatment Pain Level 5  -PC        User Key  (r) = Recorded By, (t) = Taken By, (c) = Cosigned By    Initials Name Provider Type    PC Hannah Fuentes, PT Physical Therapist    CHARLEY Acuna, PT Physical Therapist                            Therapy Education  Education Details: Discussed edema issues associated with prolonged sitting.  Given: Edema management, Symptoms/condition management  Program: Reinforced  How Provided: Verbal  Provided to: Patient  Level of Understanding: Verbalized              Time Calculation:   Start Time: 0830  Stop Time: 0951  Time  Calculation (min): 81 min  Total Timed Code Minutes- PT: 81 minute(s)   Therapy Suggested Charges     Code   Minutes Charges    23382 (CPT®) Hc Pt Neuromusc Re Education Ea 15 Min      72454 (CPT®) Hc Pt Ther Proc Ea 15 Min      89980 (CPT®) Hc Gait Training Ea 15 Min      67655 (CPT®) Hc Pt Therapeutic Act Ea 15 Min      50688 (CPT®) Hc Pt Manual Therapy Ea 15 Min 81 5    73896 (CPT®) Hc Pt Ther Massage- Per 15 Min      91578 (CPT®) Hc Pt Iontophoresis Ea 15 Min      56827 (CPT®) Hc Pt Elec Stim Ea-Per 15 Min      99699 (CPT®) Hc Pt Ultrasound Ea 15 Min      50185 (CPT®) Hc Pt Self Care/Mgmt/Train Ea 15 Min      Total  81 5        Therapy Charges for Today     Code Description Service Date Service Provider Modifiers Qty    [id number] HC PT MANUAL THERAPY EA 15 MIN 6/19/2018 Cherelle Acuna, PT GP 5                    Cherelle Acuna, PT  6/19/2018

## 2018-06-20 ENCOUNTER — HOSPITAL ENCOUNTER (OUTPATIENT)
Dept: PHYSICAL THERAPY | Facility: HOSPITAL | Age: 58
Setting detail: THERAPIES SERIES
Discharge: HOME | End: 2018-06-20

## 2018-06-20 DIAGNOSIS — I89.0 LYMPHEDEMA: Primary | ICD-10-CM

## 2018-06-20 PROCEDURE — 97140 MANUAL THERAPY 1/> REGIONS: CPT

## 2018-06-20 NOTE — THERAPY TREATMENT NOTE
Outpatient Physical Therapy Lymphedema Treatment Note  [facility]     Patient Name: Yuliet Ha  : 1960  MRN: [id number]  Today's Date: 2018        Visit Date: 2018    Visit Dx:    ICD-10-CM ICD-9-CM   1. Lymphedema I89.0 457.1       There is no problem list on file for this patient.             Lymphedema     Row Name 18 1200             Subjective Pain    Able to rate subjective pain? yes  -PC      Pre-Treatment Pain Level 5  -PC      Post-Treatment Pain Level 5  -PC         Subjective Comments    Subjective Comments Thinks the zinc oxide helped the itching.  Bandages stayed up well.  -PC         Manual Lymphatic Drainage    Manual Lymphatic Drainage Comments B inguinal, B LE's  -PC         Compression/Skin Care    Compression/Skin Care remove bandages  -PC      Skin Care washed/dried;lotion applied    HCC, Hydrophor, small amount of zinc oxide R lower leg  -PC      Wrapping Location lower extremity  -PC      Wrapping Location LE bilateral:;foot to knee  -PC      Bandaging Comments Both legs: Tg9, artiflex foot, ankle, and lower leg, Gray foam around lower half of lower leg (secured with transelast), Rosidal soft from knee to gray foam, tubigrip, 1-8cm foot and ankle, 1-10cm foot to calf, 2-12cm ankle to knee. Tubigrip over all, knee high hose to cover foot and ankle to keep bandages from coming apart in shoes  -PC        User Key  (r) = Recorded By, (t) = Taken By, (c) = Cosigned By    Initials Name Provider Type    PC Hannah Fuentes, PT Physical Therapist                              PT Assessment/Plan     Row Name 18 1238          PT Assessment    Assessment Comments Less itching reported today.  Will cont to monitor.   -PC        PT Plan    PT Plan Comments Cont, pt getting measured for custom stockings or velcro devices today.  -PC       User Key  (r) = Recorded By, (t) = Taken By, (c) = Cosigned By    Initials Name Provider Type    PC Hannah Fuentes, SERVANDO Physical  Therapist                     Exercises     Row Name 06/20/18 1241 06/20/18 1200          Subjective Comments    Subjective Comments  -- Thinks the zinc oxide helped the itching.  Bandages stayed up well.  -PC        Subjective Pain    Able to rate subjective pain?  -- yes  -PC     Pre-Treatment Pain Level  -- 5  -PC     Post-Treatment Pain Level  -- 5  -PC        Total Minutes    87299 - PT Manual Therapy Minutes 80  -PC  --       User Key  (r) = Recorded By, (t) = Taken By, (c) = Cosigned By    Initials Name Provider Type    PC Hannah Fuentes, PT Physical Therapist                            Therapy Education  Education Details: Reviewed flying precautions and skin precautions.  Given: Symptoms/condition management  Program: Reinforced  How Provided: Verbal  Provided to: Patient  Level of Understanding: Verbalized              Time Calculation:   Start Time: 0832  Stop Time: 0952  Time Calculation (min): 80 min  Total Timed Code Minutes- PT: 80 minute(s)   Therapy Suggested Charges     Code   Minutes Charges    63086 (CPT®) Hc Pt Neuromusc Re Education Ea 15 Min      87591 (CPT®) Hc Pt Ther Proc Ea 15 Min      85761 (CPT®) Hc Gait Training Ea 15 Min      64553 (CPT®) Hc Pt Therapeutic Act Ea 15 Min      02172 (CPT®) Hc Pt Manual Therapy Ea 15 Min 80 5    37993 (CPT®) Hc Pt Ther Massage- Per 15 Min      81903 (CPT®) Hc Pt Iontophoresis Ea 15 Min      89555 (CPT®) Hc Pt Elec Stim Ea-Per 15 Min      13084 (CPT®) Hc Pt Ultrasound Ea 15 Min      13546 (CPT®) Hc Pt Self Care/Mgmt/Train Ea 15 Min      Total  80 5        Therapy Charges for Today     Code Description Service Date Service Provider Modifiers Qty    [id number] HC PT MANUAL THERAPY EA 15 MIN 6/20/2018 Hannah Fuentes, PT GP 5                    Hannah Fuentes, PT  6/20/2018

## 2018-06-21 ENCOUNTER — APPOINTMENT (OUTPATIENT)
Dept: PHYSICAL THERAPY | Facility: HOSPITAL | Age: 58
End: 2018-06-21

## 2018-06-22 ENCOUNTER — HOSPITAL ENCOUNTER (OUTPATIENT)
Dept: PHYSICAL THERAPY | Facility: HOSPITAL | Age: 58
Setting detail: THERAPIES SERIES
Discharge: HOME | End: 2018-06-22

## 2018-06-22 DIAGNOSIS — I89.0 LYMPHEDEMA: Primary | ICD-10-CM

## 2018-06-22 PROCEDURE — 97140 MANUAL THERAPY 1/> REGIONS: CPT

## 2018-06-22 NOTE — THERAPY TREATMENT NOTE
Outpatient Physical Therapy Lymphedema Treatment Note  [facility]     Patient Name: Yuliet Ha  : 1960  MRN: [id number]  Today's Date: 2018        Visit Date: 2018    Visit Dx:    ICD-10-CM ICD-9-CM   1. Lymphedema I89.0 457.1       There is no problem list on file for this patient.             Lymphedema     Row Name 18 1200             Subjective Pain    Able to rate subjective pain? yes  -PC      Pre-Treatment Pain Level 5  -PC      Post-Treatment Pain Level 5  -PC         Subjective Comments    Subjective Comments States she went to "[employer] to be measured yesterday.  The fitter thought she would do better with velcro compression.  She ordered one for the left leg and then put one on the right leg that she had in the store.  While she was there waiting for her insurance to process, the velcro device on right leg fell down.  She decided it would not work for her so she did not buy it.  She plans to return to the fitter to be measured for custom stockings.  -PC         Lymphedema Measurements    Measurement Type(s) Circumferential  -PC      Circumferential Areas Lower extremities  -PC         LLE Circumferential (cm)    Measurement Location 1 10cm above knee  -PC      Left 1 93 cm  -PC      Measurement Location 2 Knee  -PC      Left 2 56 cm  -PC      Measurement Location 3 10cm below knee  -PC      Left 3 67 cm  -PC      Measurement Location 4 20cm below knee  -PC      Left 4 42.5 cm  -PC      Measurement Location 5 Ankle  -PC      Left 5 22.5 cm  -PC      Measurement Location 6 Midfoot  -PC      Left 6 24 cm  -PC      Measurement Location 7 Total  -PC      Left 7 305 cm  -PC      Measurement Location 8 Total decrease   -PC      Left 8 -48.2 cm  -PC         RLE Circumferential (cm)    Measurement Location 1 10cm above knee  -PC      Right 1 88 cm  -PC      Measurement Location 2 Knee  -PC      Right 2 53 cm  -PC      Measurement Location 3 10cm below knee  -PC      Right 3 57 cm   -PC      Measurement Location 4 20cm below knee  -PC      Right 4 40.5 cm  -PC      Measurement Location 5 Ankle  -PC      Right 5 22.1 cm  -PC      Measurement Location 6 Midfoot  -PC      Right 6 23.5 cm  -PC      Measurement Location 7 Total  -PC      Right 7 284.1 cm  -PC      Measurement Location 8 Total decrease  -PC      Right 8 -33.9 cm  -PC         Manual Lymphatic Drainage    Manual Lymphatic Drainage Comments B inguinal, B LE's  -PC         Compression/Skin Care    Compression/Skin Care remove bandages  -PC      Skin Care washed/dried;lotion applied    HCC, Hydrophor, small amount of zinc oxide R lower leg  -PC      Wrapping Location lower extremity  -PC      Wrapping Location LE bilateral:;foot to knee  -PC      Bandaging Comments Both legs: Tg9, artiflex foot, ankle, and lower leg, Gray foam around lower half of lower leg (secured with transelast), Rosidal soft from knee to gray foam, tubigrip, 1-8cm foot and ankle, 1-10cm foot to calf, 2-12cm ankle to knee. Tubigrip over all, knee high hose to cover foot and ankle to keep bandages from coming apart in shoes  -PC        User Key  (r) = Recorded By, (t) = Taken By, (c) = Cosigned By    Initials Name Provider Type    PC Hannah Fuentes, PT Physical Therapist                              PT Assessment/Plan     Row Name 06/22/18 1250          PT Assessment    Assessment Comments Msmts have plateaued.  Velcro compression did not stay up well on her, so she will be measured for custom stockings (probably [employer] as she needs the heavier fabric.)  -PC        PT Plan    PT Plan Comments Pt to cont wrapping legs until stockings arrive.  Will call and come in for a re-check once she has her stockings.  -PC       User Key  (r) = Recorded By, (t) = Taken By, (c) = Cosigned By    Initials Name Provider Type    PC Hannah Fuentes, PT Physical Therapist                     Exercises     Row Name 06/22/18 1200             Subjective Comments    Subjective Comments  States she went to [employer] to be measured yesterday.  The fitter thought she would do better with velcro compression.  She ordered one for the left leg and then put one on the right leg that she had in the store.  While she was there waiting for her insurance to process, the velcro device on right leg fell down.  She decided it would not work for her so she did not buy it.  She plans to return to the fitter to be measured for custom stockings.  -PC         Subjective Pain    Able to rate subjective pain? yes  -PC      Pre-Treatment Pain Level 5  -PC      Post-Treatment Pain Level 5  -PC        User Key  (r) = Recorded By, (t) = Taken By, (c) = Cosigned By    Initials Name Provider Type    PC Hannah Fuentes, PT Physical Therapist                              PT OP Goals     Row Name 06/22/18 1200          PT Short Term Goals    STG 1 Pt demo awareness of condition and precautions for improved prevention, management, care of symptoms, and ease of transition to self-care of condition.   -PC     STG 1 Progress Met  -PC     STG 2 Pt/family independent with self-wrapping techniques of compression bandages as indicated for improved self-management of condition.  -PC     STG 2 Progress Met  -PC     STG 3 Pt demo decreased net edema of >/=10-15cm for decreased edema symptoms, decreased risk of infection, and improved skin care.  -PC        Long Term Goals    LTG Date to Achieve 06/22/18  -PC     LTG 1 Pt/family independent with self-care techniques for self-management of condition.  -PC     LTG 1 Progress Met  -PC     LTG 2 Pt demo decreased net edema of >/=40-50cm for decreased edema symptoms, decreased risk of infection, and improved skin care.  -PC     LTG 2 Progress Partially Met  -PC     LTG 3 Pt/family independent with compression garments as indicated for self-management of condition.  -PC     LTG 3 Progress Ongoing  -PC       User Key  (r) = Recorded By, (t) = Taken By, (c) = Cosigned By    Initials Name Provider  Type    PC Hannah Fuentes, PT Physical Therapist          Therapy Education  Education Details: Discussed compression options.  Program: Reinforced  How Provided: Verbal  Provided to: Patient  Level of Understanding: Verbalized              Time Calculation:   Start Time: 0835  Stop Time: 1000  Time Calculation (min): 85 min  Total Timed Code Minutes- PT: 85 minute(s)   Therapy Suggested Charges     Code   Minutes Charges    None           Therapy Charges for Today     Code Description Service Date Service Provider Modifiers Qty    70522278484 HC PT MANUAL THERAPY EA 15 MIN 6/22/2018 Hannah Fuentes, PT GP 5                    Hannah Fuentes, PT  6/22/2018

## 2018-06-25 ENCOUNTER — APPOINTMENT (OUTPATIENT)
Dept: PHYSICAL THERAPY | Facility: HOSPITAL | Age: 58
End: 2018-06-25

## 2018-06-26 ENCOUNTER — APPOINTMENT (OUTPATIENT)
Dept: PHYSICAL THERAPY | Facility: HOSPITAL | Age: 58
End: 2018-06-26

## 2018-06-27 ENCOUNTER — APPOINTMENT (OUTPATIENT)
Dept: PHYSICAL THERAPY | Facility: HOSPITAL | Age: 58
End: 2018-06-27

## 2018-12-31 ENCOUNTER — DOCUMENTATION (OUTPATIENT)
Dept: PHYSICAL THERAPY | Facility: HOSPITAL | Age: 58
End: 2018-12-31

## 2018-12-31 DIAGNOSIS — I89.0 LYMPHEDEMA: Primary | ICD-10-CM
